# Patient Record
Sex: FEMALE | Race: BLACK OR AFRICAN AMERICAN | Employment: OTHER | URBAN - METROPOLITAN AREA
[De-identification: names, ages, dates, MRNs, and addresses within clinical notes are randomized per-mention and may not be internally consistent; named-entity substitution may affect disease eponyms.]

---

## 2017-02-27 ENCOUNTER — HOSPITAL ENCOUNTER (EMERGENCY)
Age: 52
Discharge: HOME | End: 2017-02-27
Attending: EMERGENCY MEDICINE | Admitting: EMERGENCY MEDICINE
Payer: SELF-PAY

## 2017-02-27 VITALS
DIASTOLIC BLOOD PRESSURE: 100 MMHG | TEMPERATURE: 98.2 F | OXYGEN SATURATION: 100 % | WEIGHT: 198 LBS | HEART RATE: 72 BPM | BODY MASS INDEX: 31.08 KG/M2 | HEIGHT: 67 IN | SYSTOLIC BLOOD PRESSURE: 150 MMHG | RESPIRATION RATE: 18 BRPM

## 2017-02-27 DIAGNOSIS — M79.605 PAIN OF LEFT LOWER EXTREMITY: Primary | ICD-10-CM

## 2017-02-27 DIAGNOSIS — G89.29 CHRONIC LOW BACK PAIN, UNSPECIFIED BACK PAIN LATERALITY, WITH SCIATICA PRESENCE UNSPECIFIED: ICD-10-CM

## 2017-02-27 DIAGNOSIS — M54.5 CHRONIC LOW BACK PAIN, UNSPECIFIED BACK PAIN LATERALITY, WITH SCIATICA PRESENCE UNSPECIFIED: ICD-10-CM

## 2017-02-27 DIAGNOSIS — R51.9 NONINTRACTABLE HEADACHE, UNSPECIFIED CHRONICITY PATTERN, UNSPECIFIED HEADACHE TYPE: ICD-10-CM

## 2017-02-27 LAB
ANION GAP BLD CALC-SCNC: 15 MMOL/L (ref 5–15)
BUN BLD-MCNC: 14 MG/DL (ref 9–20)
CA-I BLD-MCNC: 1.17 MMOL/L (ref 1.12–1.32)
CHLORIDE BLD-SCNC: 104 MMOL/L (ref 98–107)
CK SERPL-CCNC: 96 U/L (ref 26–192)
CO2 BLD-SCNC: 26 MMOL/L (ref 21–32)
CREAT BLD-MCNC: 0.9 MG/DL (ref 0.6–1.3)
GLUCOSE BLD-MCNC: 86 MG/DL (ref 65–105)
HCT VFR BLD CALC: 44 % (ref 35–47)
HGB BLD-MCNC: 15 GM/DL (ref 11.5–16)
POTASSIUM BLD-SCNC: 4.2 MMOL/L (ref 3.5–5.1)
SERVICE CMNT-IMP: NORMAL
SODIUM BLD-SCNC: 140 MMOL/L (ref 136–145)

## 2017-02-27 PROCEDURE — 74011250636 HC RX REV CODE- 250/636: Performed by: PHYSICIAN ASSISTANT

## 2017-02-27 PROCEDURE — 99283 EMERGENCY DEPT VISIT LOW MDM: CPT

## 2017-02-27 PROCEDURE — 80047 BASIC METABLC PNL IONIZED CA: CPT

## 2017-02-27 PROCEDURE — 82550 ASSAY OF CK (CPK): CPT | Performed by: PHYSICIAN ASSISTANT

## 2017-02-27 PROCEDURE — 36415 COLL VENOUS BLD VENIPUNCTURE: CPT | Performed by: PHYSICIAN ASSISTANT

## 2017-02-27 PROCEDURE — 96372 THER/PROPH/DIAG INJ SC/IM: CPT

## 2017-02-27 RX ORDER — KETOROLAC TROMETHAMINE 30 MG/ML
60 INJECTION, SOLUTION INTRAMUSCULAR; INTRAVENOUS
Status: COMPLETED | OUTPATIENT
Start: 2017-02-27 | End: 2017-02-27

## 2017-02-27 RX ORDER — TRAMADOL HYDROCHLORIDE 50 MG/1
50 TABLET ORAL
Qty: 15 TAB | Refills: 0 | Status: SHIPPED | OUTPATIENT
Start: 2017-02-27 | End: 2017-03-01 | Stop reason: DRUGHIGH

## 2017-02-27 RX ORDER — KETOROLAC TROMETHAMINE 30 MG/ML
30 INJECTION, SOLUTION INTRAMUSCULAR; INTRAVENOUS
Status: DISCONTINUED | OUTPATIENT
Start: 2017-02-27 | End: 2017-02-27

## 2017-02-27 RX ORDER — SODIUM CHLORIDE 9 MG/ML
1000 INJECTION, SOLUTION INTRAVENOUS ONCE
Status: DISCONTINUED | OUTPATIENT
Start: 2017-02-27 | End: 2017-02-27

## 2017-02-27 RX ORDER — IBUPROFEN 800 MG/1
800 TABLET ORAL
Qty: 20 TAB | Refills: 0 | Status: SHIPPED | OUTPATIENT
Start: 2017-02-27 | End: 2017-03-06

## 2017-02-27 RX ORDER — METHOCARBAMOL 500 MG/1
500 TABLET, FILM COATED ORAL
Qty: 15 TAB | Refills: 0 | Status: SHIPPED | OUTPATIENT
Start: 2017-02-27 | End: 2017-05-23 | Stop reason: SDUPTHER

## 2017-02-27 RX ADMIN — KETOROLAC TROMETHAMINE 60 MG: 30 INJECTION, SOLUTION INTRAMUSCULAR at 18:53

## 2017-02-27 NOTE — ED NOTES
Patient reports chronic L lower back & L leg pain, she describes as pain to the back & spasms to the leg. Denies injury or falls. Reports 2 days ago she was in so much pain that she thinks she overdosed on medication. The patient reports that #3 Tramadol 50mg pills that where , Motrin & Aleve (2 days ago). The patient reports having a headache afterwards & n/v. She is still having a headache & spasms, no n/v.     .[facility]      Emergency Department Nursing Plan of Care       The Nursing Plan of Care is developed from the Nursing assessment and Emergency Department Attending provider initial evaluation. The plan of care may be reviewed in the ED Provider note.     The Plan of Care was developed with the following considerations:   Patient / Family readiness to learn indicated by:verbalized understanding  Persons(s) to be included in education: patient  Barriers to Learning/Limitations:No    Signed     Barbara Weinberg RN    2017   6:51 PM

## 2017-02-27 NOTE — ED PROVIDER NOTES
Patient is a 49 y.o. female presenting with back pain and leg pain. The history is provided by the patient. Back Pain    This is a chronic (pt reports hx of chronic back pain. pt states she normally takes ultram which helps with back pain but hasn't had any meds to help with mm spasm.) problem. The problem has not changed since onset. The problem occurs daily. Patient reports not work related injury. The pain is associated with no known injury. The pain is present in the left side and lower back. The pain does not radiate. The pain is at a severity of 9/10. The pain is moderate. Associated symptoms include headaches, leg pain and paresthesias. Pertinent negatives include no fever, no abdominal pain, no bowel incontinence, no bladder incontinence, no dysuria, no paresis and no weakness. Treatments tried: pt states on Sat she took 3 ultram, 2 aleeve and ibuprofen. pt states the room started to get dark and she scared herself because she started having HA, N/V so she hasn't taken anything since. Leg Pain    This is a chronic problem. Episode onset: pt reports intermittent mm spasm. The problem occurs daily. The problem has not changed since onset. The pain is present in the left upper leg and left lower leg. The pain is at a severity of 9/10. The pain is moderate. Associated symptoms include back pain. She has tried nothing for the symptoms. There has been no history of extremity trauma. Past Medical History:   Diagnosis Date    Abdominal abscess ([address]     Contact dermatitis and other eczema, due to unspecified cause     Depression     Dyslipidemia     Headache(784.0)     History of mammography, screening 12/2013    Normal     Hypertension     Lumbar stenosis july 2012    Pleurisy     Psychiatric disorder     DEPRESSION.     Scoliosis        Past Surgical History:   Procedure Laterality Date    ABDOMEN SURGERY PROC UNLISTED      abdominal abscess drainage    ENDOSCOPY, COLON, DIAGNOSTIC      HX COLONOSCOPY      HX ENDOSCOPY      HX GI      COLONOSCOPY X 1    HX GYN       , tubal ligation     HX GYN      hysterectomy    HX ORTHOPAEDIC      ganglian cyst removed from right foot  &          Family History:   Problem Relation Age of Onset    Diabetes Mother     Thyroid Disease Mother     Thyroid Disease Father     Hypertension Father     Asthma Sister     MS Sister     Thyroid Disease Sister        Social History     Social History    Marital status:      Spouse name: N/A    Number of children: N/A    Years of education: N/A     Occupational History    Not on file. Social History Main Topics    Smoking status: Former Smoker     Quit date: 2004    Smokeless tobacco: Never Used    Alcohol use 0.0 oz/week      Comment: 1 or 2 glass of wine a month     Drug use: No    Sexual activity: Yes     Partners: Male      Comment: single     Other Topics Concern    Not on file     Social History Narrative         ALLERGIES: Egg; Sulfa (sulfonamide antibiotics); Broccoli; Chicken derived; Dairy aid [lactase]; Doxycycline; Gluten; Pravastatin; Tetracycline; and Wheat    Review of Systems   Constitutional: Negative for fever. Gastrointestinal: Positive for nausea and vomiting (resolved). Negative for abdominal pain and bowel incontinence. Genitourinary: Negative for bladder incontinence and dysuria. Musculoskeletal: Positive for back pain and myalgias. Skin: Negative. Neurological: Positive for headaches and paresthesias. Negative for weakness. All other systems reviewed and are negative. Vitals:    17 1808   BP: (!) 148/107   Pulse: 64   Resp: 18   Temp: 98.2 °F (36.8 °C)   SpO2: 100%   Weight: 89.8 kg (198 lb)   Height: 5' 6.5\" (1.689 m)            Physical Exam   Constitutional: She is oriented to person, place, and time. She appears well-developed and well-nourished. No distress. HENT:   Head: Normocephalic and atraumatic. Mouth/Throat: Oropharynx is clear and moist. No oropharyngeal exudate. Eyes: Conjunctivae are normal.   Cardiovascular: Normal rate, regular rhythm and normal heart sounds. Pulmonary/Chest: Effort normal and breath sounds normal. No respiratory distress. She has no wheezes. She has no rales. Musculoskeletal: Normal range of motion. Lumbar back: She exhibits tenderness and pain (to the L. lumbar mm, -SLR bilaterally). She exhibits normal range of motion, no bony tenderness and normal pulse. Neurological: She is alert and oriented to person, place, and time. Skin: Skin is warm and dry. Psychiatric: She has a normal mood and affect. Her behavior is normal. Judgment and thought content normal.   Vitals reviewed.        MDM  Number of Diagnoses or Management Options  Diagnosis management comments: DDX: sciatica, back strain, sprain, electrolyte imbalance, rhabdomylosis       Amount and/or Complexity of Data Reviewed  Clinical lab tests: ordered and reviewed      ED Course       Procedures

## 2017-02-27 NOTE — ED TRIAGE NOTES
Pt reports low back pain and left leg pain, constant sharp, stabbing pain x a few weeks, worse x 2 days. Pt states, \"now I have a headache and I'm vomiting because I took too much medicine on Saturday because I was hurting so bad. \"  Pt reports taking Tramadol, Aleve, Ibuprofen with vomiting. Pt reports vomiting x 2 days.

## 2017-02-28 NOTE — DISCHARGE INSTRUCTIONS
Headache: Care Instructions  Your Care Instructions    Headaches have many possible causes. Most headaches aren't a sign of a more serious problem, and they will get better on their own. Home treatment may help you feel better faster. The doctor has checked you carefully, but problems can develop later. If you notice any problems or new symptoms, get medical treatment right away. Follow-up care is a key part of your treatment and safety. Be sure to make and go to all appointments, and call your doctor if you are having problems. It's also a good idea to know your test results and keep a list of the medicines you take. How can you care for yourself at home? · Do not drive if you have taken a prescription pain medicine. · Rest in a quiet, dark room until your headache is gone. Close your eyes and try to relax or go to sleep. Don't watch TV or read. · Put a cold, moist cloth or cold pack on the painful area for 10 to 20 minutes at a time. Put a thin cloth between the cold pack and your skin. · Use a warm, moist towel or a heating pad set on low to relax tight shoulder and neck muscles. · Have someone gently massage your neck and shoulders. · Take pain medicines exactly as directed. ¨ If the doctor gave you a prescription medicine for pain, take it as prescribed. ¨ If you are not taking a prescription pain medicine, ask your doctor if you can take an over-the-counter medicine. · Be careful not to take pain medicine more often than the instructions allow, because you may get worse or more frequent headaches when the medicine wears off. · Do not ignore new symptoms that occur with a headache, such as a fever, weakness or numbness, vision changes, or confusion. These may be signs of a more serious problem. To prevent headaches  · Keep a headache diary so you can figure out what triggers your headaches. Avoiding triggers may help you prevent headaches.  Record when each headache began, how long it lasted, and what the pain was like (throbbing, aching, stabbing, or dull). Write down any other symptoms you had with the headache, such as nausea, flashing lights or dark spots, or sensitivity to bright light or loud noise. Note if the headache occurred near your period. List anything that might have triggered the headache, such as certain foods (chocolate, cheese, wine) or odors, smoke, bright light, stress, or lack of sleep. · Find healthy ways to deal with stress. Headaches are most common during or right after stressful times. Take time to relax before and after you do something that has caused a headache in the past.  · Try to keep your muscles relaxed by keeping good posture. Check your jaw, face, neck, and shoulder muscles for tension, and try relaxing them. When sitting at a desk, change positions often, and stretch for 30 seconds each hour. · Get plenty of sleep and exercise. · Eat regularly and well. Long periods without food can trigger a headache. · Treat yourself to a massage. Some people find that regular massages are very helpful in relieving tension. · Limit caffeine by not drinking too much coffee, tea, or soda. But don't quit caffeine suddenly, because that can also give you headaches. · Reduce eyestrain from computers by blinking frequently and looking away from the computer screen every so often. Make sure you have proper eyewear and that your monitor is set up properly, about an arm's length away. · Seek help if you have depression or anxiety. Your headaches may be linked to these conditions. Treatment can both prevent headaches and help with symptoms of anxiety or depression. When should you call for help? Call 911 anytime you think you may need emergency care. For example, call if:  · You have signs of a stroke. These may include:  ¨ Sudden numbness, paralysis, or weakness in your face, arm, or leg, especially on only one side of your body. ¨ Sudden vision changes.   ¨ Sudden trouble speaking. ¨ Sudden confusion or trouble understanding simple statements. ¨ Sudden problems with walking or balance. ¨ A sudden, severe headache that is different from past headaches. Call your doctor now or seek immediate medical care if:  · You have a new or worse headache. · Your headache gets much worse. Where can you learn more? Go to http://sunshine-kandi.info/. Enter M271 in the search box to learn more about \"Headache: Care Instructions. \"  Current as of: February 19, 2016  Content Version: 11.1  © 6906-2948 [employer]. Care instructions adapted under license by [employer] (which disclaims liability or warranty for this information). If you have questions about a medical condition or this instruction, always ask your healthcare professional. [address] any warranty or liability for your use of this information. Back Pain: Care Instructions  Your Care Instructions    Back pain has many possible causes. It is often related to problems with muscles and ligaments of the back. It may also be related to problems with the nerves, discs, or bones of the back. Moving, lifting, standing, sitting, or sleeping in an awkward way can strain the back. Sometimes you don't notice the injury until later. Arthritis is another common cause of back pain. Although it may hurt a lot, back pain usually improves on its own within several weeks. Most people recover in 12 weeks or less. Using good home treatment and being careful not to stress your back can help you feel better sooner. Follow-up care is a key part of your treatment and safety. Be sure to make and go to all appointments, and call your doctor if you are having problems. Its also a good idea to know your test results and keep a list of the medicines you take. How can you care for yourself at home? · Sit or lie in positions that are most comfortable and reduce your pain.  Try one of these positions when you lie down:  ¨ Lie on your back with your knees bent and supported by large pillows. ¨ Lie on the floor with your legs on the seat of a sofa or chair. Barbara Jason on your side with your knees and hips bent and a pillow between your legs. ¨ Lie on your stomach if it does not make pain worse. · Do not sit up in bed, and avoid soft couches and twisted positions. Bed rest can help relieve pain at first, but it delays healing. Avoid bed rest after the first day of back pain. · Change positions every 30 minutes. If you must sit for long periods of time, take breaks from sitting. Get up and walk around, or lie in a comfortable position. · Try using a heating pad on a low or medium setting for 15 to 20 minutes every 2 or 3 hours. Try a warm shower in place of one session with the heating pad. · You can also try an ice pack for 10 to 15 minutes every 2 to 3 hours. Put a thin cloth between the ice pack and your skin. · Take pain medicines exactly as directed. ¨ If the doctor gave you a prescription medicine for pain, take it as prescribed. ¨ If you are not taking a prescription pain medicine, ask your doctor if you can take an over-the-counter medicine. · Take short walks several times a day. You can start with 5 to 10 minutes, 3 or 4 times a day, and work up to longer walks. Walk on level surfaces and avoid hills and stairs until your back is better. · Return to work and other activities as soon as you can. Continued rest without activity is usually not good for your back. · To prevent future back pain, do exercises to stretch and strengthen your back and stomach. Learn how to use good posture, safe lifting techniques, and proper body mechanics. When should you call for help? Call your doctor now or seek immediate medical care if:  · You have new or worsening numbness in your legs. · You have new or worsening weakness in your legs.  (This could make it hard to stand up.)  · You lose control of your bladder or bowels. Watch closely for changes in your health, and be sure to contact your doctor if:  · Your pain gets worse. · You are not getting better after 2 weeks. Where can you learn more? Go to http://sunshine-kandi.info/. Enter M676 in the search box to learn more about \"Back Pain: Care Instructions. \"  Current as of: May 23, 2016  Content Version: 11.1  © 2006-2016 [employer]. Care instructions adapted under license by [employer] (which disclaims liability or warranty for this information). If you have questions about a medical condition or this instruction, always ask your healthcare professional. [address] any warranty or liability for your use of this information. Leg Pain: Care Instructions  Your Care Instructions  Many things can cause leg pain. Too much exercise or overuse can cause a muscle cramp (or charley horse). You can get leg cramps from not eating a balanced diet that has enough potassium, calcium, and other minerals. If you do not drink enough fluids or are taking certain medicines, you may develop leg cramps. Other causes of leg pain include injuries, blood flow problems, nerve damage, and twisted and enlarged veins (varicose veins). You can usually ease pain with self-care. Your doctor may recommend that you rest your leg and keep it elevated. Follow-up care is a key part of your treatment and safety. Be sure to make and go to all appointments, and call your doctor if you are having problems. Its also a good idea to know your test results and keep a list of the medicines you take. How can you care for yourself at home? · Take pain medicines exactly as directed. ¨ If the doctor gave you a prescription medicine for pain, take it as prescribed. ¨ If you are not taking a prescription pain medicine, ask your doctor if you can take an over-the-counter medicine. · Take any other medicines exactly as prescribed. Call your doctor if you think you are having a problem with your medicine. · Rest your leg while you have pain, and avoid standing for long periods of time. · Prop up your leg at or above the level of your heart when possible. · Make sure you are eating a balanced diet that is rich in calcium, potassium, and magnesium, especially if you are pregnant. · If directed by your doctor, put ice or a cold pack on the area for 10 to 20 minutes at a time. Put a thin cloth between the ice and your skin. · Your leg may be in a splint, a brace, or an elastic bandage, and you may have crutches to help you walk. Follow your doctor's directions about how long to wear supports and how to use the crutches. When should you call for help? Call 911 anytime you think you may need emergency care. For example, call if:  · You have sudden chest pain and shortness of breath, or you cough up blood. · Your leg is cool or pale or changes color. Call your doctor now or seek immediate medical care if:  · You have increasing or severe pain. · Your leg suddenly feels weak and you cannot move it. · You have signs of a blood clot, such as:  ¨ Pain in your calf, back of the knee, thigh, or groin. ¨ Redness and swelling in your leg or groin. · You have signs of infection, such as:  ¨ Increased pain, swelling, warmth, or redness. ¨ Red streaks leading from the sore area. ¨ Pus draining from a place on your leg. ¨ A fever. · You cannot bear weight on your leg. Watch closely for changes in your health, and be sure to contact your doctor if:  · You do not get better as expected. Where can you learn more? Go to http://sunshnie-kandi.info/. Enter J445 in the search box to learn more about \"Leg Pain: Care Instructions. \"  Current as of: May 27, 2016  Content Version: 11.1  © 7022-5075 [employer].  Care instructions adapted under license by [employer] (which disclaims liability or warranty for this information). If you have questions about a medical condition or this instruction, always ask your healthcare professional. [place] [address] any warranty or liability for your use of this information.

## 2017-02-28 NOTE — ED NOTES
Verbal shift [facility] ([facility]) by María Maya (offgoing nurse). Report included the following information SBAR and Procedure Summary.

## 2017-02-28 NOTE — ED NOTES
Discharge Instructions Reviewed with patient per [facility]. Discharge instructions given to paitent per [facility]. Patient able to return verbalize discharge instructions. Paper copy of discharge instructions given. 3 RX given to patient per [facility]. Patient condition stable, Respiratory status WNL, Neurostatus intact. Ambulatory out of er, to home with self.  Patient ambulatory out of ED prior to taking discharge vital signs

## 2017-03-01 ENCOUNTER — OFFICE VISIT (OUTPATIENT)
Dept: INTERNAL MEDICINE CLINIC | Facility: CLINIC | Age: 52
End: 2017-03-01

## 2017-03-01 VITALS
HEIGHT: 67 IN | BODY MASS INDEX: 33.74 KG/M2 | SYSTOLIC BLOOD PRESSURE: 134 MMHG | DIASTOLIC BLOOD PRESSURE: 78 MMHG | HEART RATE: 60 BPM | WEIGHT: 215 LBS | TEMPERATURE: 97.3 F | RESPIRATION RATE: 18 BRPM

## 2017-03-01 DIAGNOSIS — G47.33 OSA ON CPAP: ICD-10-CM

## 2017-03-01 DIAGNOSIS — Z99.89 OSA ON CPAP: ICD-10-CM

## 2017-03-01 DIAGNOSIS — G89.29 CHRONIC BILATERAL LOW BACK PAIN WITH BILATERAL SCIATICA: Primary | ICD-10-CM

## 2017-03-01 DIAGNOSIS — J45.990 EXERCISE-INDUCED ASTHMA: ICD-10-CM

## 2017-03-01 DIAGNOSIS — T78.08XA: ICD-10-CM

## 2017-03-01 DIAGNOSIS — Z12.39 SCREENING FOR BREAST CANCER: ICD-10-CM

## 2017-03-01 DIAGNOSIS — M79.7 FIBROMYALGIA: ICD-10-CM

## 2017-03-01 DIAGNOSIS — M54.42 CHRONIC BILATERAL LOW BACK PAIN WITH BILATERAL SCIATICA: Primary | ICD-10-CM

## 2017-03-01 DIAGNOSIS — J01.90 ACUTE NON-RECURRENT SINUSITIS, UNSPECIFIED LOCATION: ICD-10-CM

## 2017-03-01 DIAGNOSIS — M54.41 CHRONIC BILATERAL LOW BACK PAIN WITH BILATERAL SCIATICA: Primary | ICD-10-CM

## 2017-03-01 RX ORDER — GABAPENTIN 300 MG/1
300 CAPSULE ORAL 3 TIMES DAILY
Qty: 90 CAP | Refills: 2 | Status: SHIPPED | OUTPATIENT
Start: 2017-03-01 | End: 2017-05-23 | Stop reason: SDUPTHER

## 2017-03-01 RX ORDER — EPINEPHRINE 0.3 MG/.3ML
0.3 INJECTION SUBCUTANEOUS
Qty: 1 SYRINGE | Refills: 1 | Status: SHIPPED | OUTPATIENT
Start: 2017-03-01 | End: 2017-08-08 | Stop reason: SDUPTHER

## 2017-03-01 RX ORDER — TRAMADOL HYDROCHLORIDE 50 MG/1
50 TABLET ORAL
Qty: 60 TAB | Refills: 1 | Status: SHIPPED | OUTPATIENT
Start: 2017-03-01 | End: 2017-10-24

## 2017-03-01 RX ORDER — AMOXICILLIN AND CLAVULANATE POTASSIUM 875; 125 MG/1; MG/1
1 TABLET, FILM COATED ORAL EVERY 12 HOURS
Qty: 14 TAB | Refills: 0 | Status: SHIPPED | OUTPATIENT
Start: 2017-03-01 | End: 2017-04-18

## 2017-03-01 NOTE — MR AVS SNAPSHOT
Visit Information Date & Time Provider Department Dept. Phone Encounter #  
 3/1/2017  9:00 AM Amanda Sinha, [address] Internal Medicine [phone] [id number] Follow-up Instructions Return for Physical when convenient. Upcoming Health Maintenance Date Due DTaP/Tdap/Td series (1 - Tdap) 9/16/1987 PAP AKA CERVICAL CYTOLOGY 9/16/1987 INFLUENZA AGE 9 TO ADULT 8/1/2016 BREAST CANCER SCRN MAMMOGRAM 9/16/2016 FOBT Q 1 YEAR AGE 50-75 9/16/2016 Allergies as of 3/1/2017  Review Complete On: 3/1/2017 By: Amanda Sinha PA-C Severity Noted Reaction Type Reactions Egg High 01/28/2011    Shortness of Breath, Nausea and Vomiting  
 Sulfa (Sulfonamide Antibiotics) High 11/12/2010    Anaphylaxis, Shortness of Breath, Swelling Broccoli  01/28/2011    Rash, Itching Chicken Derived  02/11/2013    Nausea and Vomiting Dairy Aid [Lactase]  01/28/2011    Swelling Doxycycline  11/12/2010    Unknown (comments) Gluten  01/28/2011    Swelling Pravastatin  09/04/2012    Myalgia, Other (comments) Blurry vision Tetracycline  11/12/2010    Unknown (comments) Wheat  01/28/2011    Nausea and Vomiting Current Immunizations  Reviewed on 11/22/2013 Name Date Influenza Vaccine PF  Deferred (Patient Refused) Pneumococcal Conjugate (PCV-13)  Deferred (Patient Refused) Not reviewed this visit You Were Diagnosed With   
  
 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  
  
  
  
  
  
 134/78 Vitals History BMI and BSA Data Body Mass Index Body Surface Area  
 34.18 kg/m 2 2.14 m 2 Preferred Pharmacy Pharmacy Name Phone [facility] [address] [place] [facility] Boby Luong [phone] Your Updated Medication List  
  
   
This list is accurate as of: 3/1/17  9:41 AM.  Always use your most recent med list.  
  
  
  
  
 amoxicillin-clavulanate 875-125 mg per tablet Commonly known as:  AUGMENTIN Take 1 Tab by mouth every twelve (12) hours. EPINEPHrine 0.3 mg/0.3 mL injection Commonly known as:  EPIPEN  
0.3 mL by IntraMUSCular route once as needed for up to 1 dose. gabapentin 300 mg capsule Commonly known as:  NEURONTIN Take 1 Cap by mouth three (3) times daily. ibuprofen 800 mg tablet Commonly known as:  MOTRIN Take 1 Tab by mouth every six (6) hours as needed for Pain for up to 7 days. methocarbamol 500 mg tablet Commonly known as:  ROBAXIN Take 1 Tab by mouth every six (6) hours as needed (mm spasm). traMADol 50 mg tablet Commonly known as:  ULTRAM  
Take 1 Tab by mouth every six (6) hours as needed for Pain. Max Daily Amount: 200 mg. Prescriptions Printed Refills  
 traMADol (ULTRAM) 50 mg tablet 1 Sig: Take 1 Tab by mouth every six (6) hours as needed for Pain. Max Daily Amount: 200 mg. Class: Print Route: Oral  
  
Prescriptions Sent to Pharmacy Refills EPINEPHrine (EPIPEN) 0.3 mg/0.3 mL injection 1 Si.3 mL by IntraMUSCular route once as needed for up to 1 dose. Class: Normal  
 [facility] [id number] [place] [facility] [phone] Route: IntraMUSCular  
 gabapentin (NEURONTIN) 300 mg capsule 2 Sig: Take 1 Cap by mouth three (3) times daily. Class: Normal  
 [facility] [address] [place] [facility] #: [phone]  Route: Oral  
 amoxicillin-clavulanate (AUGMENTIN) 875-125 mg per tablet 0  
 Sig: Take 1 Tab by mouth every twelve (12) hours. Class: Normal  
 Pharmacy: [facility] [id number] [place] [facility] Boby PATTERSON  #: [phone] Route: Oral  
  
Follow-up Instructions Return for Physical when convenient. Introducing [facility]! Dear Carmela Cowden: Thank you for requesting a [employer] account. Our records indicate that you already have an active [employer] account. You can access your account anytime at [email or web address] Did you know that you can access your hospital and ER discharge instructions at any time in [employer] You can also review all of your test results from your hospital stay or ER visit. Additional Information If you have questions, please visit the Frequently Asked Questions section of the [employer] website at [email or web address] Remember, [employer] is NOT to be used for urgent needs. For medical emergencies, dial 911. Now available from your iPhone and Android! Please provide this summary of care documentation to your next provider. Your primary care clinician is listed as Ramona Sevilla. If you have any questions after today's visit, please call [phone].

## 2017-03-01 NOTE — PROGRESS NOTES
Chief Complaint   Patient presents with   [facility] Follow Up     left leg and back pain. 1. Have you been to the ER, urgent care clinic since your last visit? Hospitalized since your last visit? No    2. Have you seen or consulted any other health care providers outside of the [address] [place] [address] since your last visit? Include any pap smears or colon screening.  No

## 2017-03-01 NOTE — PROGRESS NOTES
HISTORY OF PRESENT ILLNESS  Otis Juárez is a 49 y.o. female. New patient to our practice - wants to change practices. HPI  1) Hx low back pain/leg pain x 15 years - had lumbar laminectomy in 2015. Has had multiple follow ups with ortho. Was on narcotics regularly until 2013 - weaned off of them and doesn't want to go back to narcotics. Pain is improved with gabapentin 300 mg TID. Ran out when she didn't have insurance last year. Has been off of gabapentin since Thanksgiving. Pain is improved with exercise 3x/week. 2) JENNIE - hasn't used CPAP in 5 months - not snoring and not fatigued. Lost weight over the past 2 years. Was previously 273 lbs. Hasn't had re-evaluation since losing weight. 3) Hx HTN - Well controlled upon recheck in our office today. Not requiring medication. 4) Sinus headache x 2 weeks. Nasal congestion. Cough off and on - nonproductive. Review of Systems   Constitutional: Positive for malaise/fatigue. Negative for fever. HENT: Positive for congestion. Negative for ear pain. Respiratory: Positive for cough and sputum production. Negative for shortness of breath. Musculoskeletal: Positive for back pain and myalgias. Negative for falls. Neurological: Positive for headaches. Endo/Heme/Allergies: Negative for environmental allergies. Psychiatric/Behavioral: Negative for depression and substance abuse. The patient is not nervous/anxious. Physical Exam   Constitutional: She is oriented to person, place, and time. She appears well-developed and well-nourished. No distress. HENT:   Head: Normocephalic and atraumatic. Mouth/Throat: Oropharynx is clear and moist.   Bilateral TMs with fluid. No erythema. Nasal mucosa red, inflamed. Eyes: Conjunctivae are normal.   Neck: Neck supple. Cardiovascular: Normal rate, regular rhythm and normal heart sounds.     Pulmonary/Chest: Effort normal and breath sounds normal.   Lymphadenopathy:     She has no cervical adenopathy. Neurological: She is alert and oriented to person, place, and time. Skin: Skin is warm and dry. Nursing note and vitals reviewed. ASSESSMENT and PLAN    ICD-10-CM ICD-9-CM    1. Chronic bilateral low back pain with bilateral sciatica M54.42 724.2 Restart and taper up to previous dose of 300 mg TID: gabapentin (NEURONTIN) 300 mg capsule    M54.41 724.3 traMADol (ULTRAM) 50 mg tablet PRN. Advised pt to use this sparingly. Advised pt that she can fill the Robaxin Rx'd by ER, and use this sparingly until she is under better control with gabapentin. G89.29 338.29    2. Fibromyalgia M79.7 729.1 Refill gabapentin (NEURONTIN) 300 mg capsule   3. Exercise-induced asthma J45.990 493.81 Pt has albuterol inhaler at home and it is UTD. 4. Allergy with anaphylaxis due to eggs T78. 08XA 995.68 EPINEPHrine (EPIPEN) 0.3 mg/0.3 mL injection   5. Acute non-recurrent sinusitis, unspecified location J01.90 461.9 amoxicillin-clavulanate (AUGMENTIN) 875-125 mg per tablet   6. Screening for breast cancer Z12.39 V76.10 ALIA MAMMO BI SCREENING INCL CAD   7. JENNIE on CPAP G47.33 327.23 Encouraged pt to f/u with Sleep Med, as untreated JENNIE may be making her pain worse, and given her excellent weight loss, she may need an adjustment in CPAP settings.

## 2017-03-03 ENCOUNTER — HOSPITAL ENCOUNTER (OUTPATIENT)
Dept: MAMMOGRAPHY | Age: 52
Discharge: HOME | End: 2017-03-03
Attending: PHYSICIAN ASSISTANT
Payer: COMMERCIAL

## 2017-03-03 DIAGNOSIS — Z12.31 VISIT FOR SCREENING MAMMOGRAM: ICD-10-CM

## 2017-03-03 PROCEDURE — 77067 SCR MAMMO BI INCL CAD: CPT

## 2017-03-21 ENCOUNTER — OFFICE VISIT (OUTPATIENT)
Dept: INTERNAL MEDICINE CLINIC | Facility: CLINIC | Age: 52
End: 2017-03-21

## 2017-03-21 VITALS
WEIGHT: 210 LBS | OXYGEN SATURATION: 97 % | HEIGHT: 67 IN | HEART RATE: 70 BPM | SYSTOLIC BLOOD PRESSURE: 135 MMHG | BODY MASS INDEX: 32.96 KG/M2 | TEMPERATURE: 97.9 F | RESPIRATION RATE: 18 BRPM | DIASTOLIC BLOOD PRESSURE: 90 MMHG

## 2017-03-21 DIAGNOSIS — J01.91 ACUTE RECURRENT SINUSITIS, UNSPECIFIED LOCATION: Primary | ICD-10-CM

## 2017-03-21 RX ORDER — LEVOFLOXACIN 500 MG/1
500 TABLET, FILM COATED ORAL DAILY
Qty: 10 TAB | Refills: 0 | Status: SHIPPED | OUTPATIENT
Start: 2017-03-21 | End: 2017-04-19

## 2017-03-21 RX ORDER — METHYLPREDNISOLONE 4 MG/1
TABLET ORAL
Qty: 1 DOSE PACK | Refills: 0 | Status: SHIPPED | OUTPATIENT
Start: 2017-03-21 | End: 2017-04-19

## 2017-03-21 NOTE — PROGRESS NOTES
Room 8    Chief Complaint   Patient presents with    Ear Pain     Patient states she has post-nasal drip in the left ear area. Head aches     1. Have you been to the ER, urgent care clinic since your last visit? Hospitalized since your last visit? No    2. Have you seen or consulted any other health care providers outside of the [address] [place] [address] since your last visit? Include any pap smears or colon screening.  No     Health Maintenance Due   Topic Date Due    Pneumococcal 19-64 Medium Risk (1 of 1 - PPSV23) 09/16/1985    DTaP/Tdap/Td series (1 - Tdap) 09/16/1987    FOBT Q 1 YEAR AGE 50-75  09/16/2016

## 2017-03-21 NOTE — PROGRESS NOTES
HISTORY OF PRESENT ILLNESS  Samara Higgins is a 49 y.o. female. HPI  1) Sick since last visit. Finished Augmentin - started getting better, but then sx returned. L ear pain/itching, headache. No cough. Tried [employer] sinus congestion medication - not helping. Drinking Elderberry syrup - helps with sore throat. Has a new grandchild due this weekend! Review of Systems   Constitutional: Positive for malaise/fatigue. Negative for fever. HENT: Positive for congestion and ear pain. Respiratory: Negative for cough, sputum production and shortness of breath. Neurological: Positive for headaches. Endo/Heme/Allergies: Negative for environmental allergies. Physical Exam   Constitutional: She is oriented to person, place, and time. She appears well-developed and well-nourished. No distress. HENT:   Head: Normocephalic and atraumatic. Mouth/Throat: Oropharynx is clear and moist.   Bilateral TMs with fluid. No erythema. Nasal mucosa red, inflamed. Eyes: Conjunctivae are normal.   Neck: Neck supple. Cardiovascular: Normal rate, regular rhythm and normal heart sounds. Pulmonary/Chest: Effort normal and breath sounds normal.   Lymphadenopathy:     She has no cervical adenopathy. Neurological: She is alert and oriented to person, place, and time. Skin: Skin is warm and dry. Nursing note and vitals reviewed. ASSESSMENT and PLAN    ICD-10-CM ICD-9-CM    1.  Acute recurrent sinusitis, unspecified location J01.91 461.9 methylPREDNISolone (MEDROL DOSEPACK) 4 mg tablet      levoFLOXacin (LEVAQUIN) 500 mg tablet

## 2017-04-19 ENCOUNTER — OFFICE VISIT (OUTPATIENT)
Dept: INTERNAL MEDICINE CLINIC | Facility: CLINIC | Age: 52
End: 2017-04-19

## 2017-04-19 VITALS
SYSTOLIC BLOOD PRESSURE: 144 MMHG | HEART RATE: 59 BPM | DIASTOLIC BLOOD PRESSURE: 92 MMHG | HEIGHT: 67 IN | TEMPERATURE: 97.7 F | BODY MASS INDEX: 33.16 KG/M2 | RESPIRATION RATE: 18 BRPM | WEIGHT: 211.3 LBS | OXYGEN SATURATION: 99 %

## 2017-04-19 DIAGNOSIS — J30.2 SEASONAL ALLERGIC RHINITIS, UNSPECIFIED ALLERGIC RHINITIS TRIGGER: Primary | ICD-10-CM

## 2017-04-19 DIAGNOSIS — I10 ESSENTIAL HYPERTENSION: ICD-10-CM

## 2017-04-19 DIAGNOSIS — M54.42 CHRONIC BILATERAL LOW BACK PAIN WITH LEFT-SIDED SCIATICA: ICD-10-CM

## 2017-04-19 DIAGNOSIS — G89.29 CHRONIC BILATERAL LOW BACK PAIN WITH LEFT-SIDED SCIATICA: ICD-10-CM

## 2017-04-19 PROBLEM — M54.41 CHRONIC BILATERAL LOW BACK PAIN WITH BILATERAL SCIATICA: Status: RESOLVED | Noted: 2017-03-01 | Resolved: 2017-04-19

## 2017-04-19 RX ORDER — AMLODIPINE BESYLATE 5 MG/1
5 TABLET ORAL DAILY
Qty: 30 TAB | Refills: 5 | Status: SHIPPED | OUTPATIENT
Start: 2017-04-19 | End: 2017-05-23 | Stop reason: SDUPTHER

## 2017-04-19 RX ORDER — CETIRIZINE HCL 10 MG
10 TABLET ORAL
Qty: 30 TAB | Refills: 11 | Status: SHIPPED | OUTPATIENT
Start: 2017-04-19 | End: 2017-05-23 | Stop reason: SDUPTHER

## 2017-04-19 NOTE — PROGRESS NOTES
HISTORY OF PRESENT ILLNESS  Parul Cao is a 49 y.o. female. HPI  1) Fibromyalgia, Sciatica - denied for disability 3 times now. Last saw ortho over 1 year ago. Pt notes her back pain/sciatica is not controlled on gabapentin and she does not want to do surgery. She understands that we do not write narcotic pain medication for chronic pain. She plans to follow up with ortho (she will call for referral order) and reapply for disability. 2) Allergic Rhinitis - sinus headaches, nasal congestion, cough. No Sore throat/SOB. Sinus infection resolved with Levaquin and Medrol in late March. 3) Elevated BP - was on Amlodipine and HCTZ in the past. Has been off of both for over 1 year. BP Readings from Last 3 Encounters:   04/19/17 (!) 144/92   03/21/17 135/90   03/01/17 134/78     4) Depression/Anxiety - does not want to take medication or treat this as antidepressants as they made her feel \"flat\". Tried many including Cymbalta and Wellbutrin. Lots of stress with aging parents. Review of Systems   Constitutional: Positive for malaise/fatigue. Negative for fever. HENT: Positive for congestion and ear pain. Eyes: Positive for discharge. Respiratory: Positive for cough. Negative for sputum production and shortness of breath. Musculoskeletal: Positive for back pain, joint pain and myalgias. Skin: Negative for rash. Neurological: Positive for headaches. Endo/Heme/Allergies: Positive for environmental allergies. Psychiatric/Behavioral: Positive for depression. The patient is nervous/anxious. Physical Exam   Constitutional: She is oriented to person, place, and time. She appears well-developed and well-nourished. No distress. HENT:   Head: Normocephalic and atraumatic. Mouth/Throat: Oropharynx is clear and moist.   Bilateral TMs with fluid. No erythema. Nasal mucosa pale, inflamed. Eyes: Conjunctivae are normal.   Neck: Neck supple.    Cardiovascular: Normal rate, regular rhythm and normal heart sounds. Pulmonary/Chest: Effort normal and breath sounds normal.   Lymphadenopathy:     She has no cervical adenopathy. Neurological: She is alert and oriented to person, place, and time. Skin: Skin is warm and dry. Nursing note and vitals reviewed. ASSESSMENT and PLAN    ICD-10-CM ICD-9-CM    1. Seasonal allergic rhinitis, unspecified allergic rhinitis trigger J30.2 477.9 cetirizine (ZYRTEC) 10 mg tablet  Also recommended OTC Flonase   2. Chronic bilateral low back pain with left-sided sciatica M54.42 724.2 Follow up with ortho. G89.29 724.3      338.29    3.  Essential hypertension I10 401.9 Start amLODIPine (NORVASC) 5 mg tablet

## 2017-04-19 NOTE — PROGRESS NOTES
Room 8    Chief Complaint   Patient presents with    Back Pain     States she would like o discuss the pain in back ,legs,and hand. States she still has problems with her sinus also     /1. Have you been to the ER, urgent care clinic since your last visit? Hospitalized since your last visit? No    2. Have you seen or consulted any other health care providers outside of the [address] [place] [address] since your last visit? Include any pap smears or colon screening.  No     Health Maintenance Due   Topic Date Due    Pneumococcal 19-64 Medium Risk (1 of 1 - PPSV23) 09/16/1985    DTaP/Tdap/Td series (1 - Tdap) 09/16/1987    FOBT Q 1 YEAR AGE 50-75  09/16/2016

## 2017-04-21 ENCOUNTER — PATIENT MESSAGE (OUTPATIENT)
Dept: INTERNAL MEDICINE CLINIC | Facility: CLINIC | Age: 52
End: 2017-04-21

## 2017-04-21 DIAGNOSIS — M54.42 CHRONIC BILATERAL LOW BACK PAIN WITH LEFT-SIDED SCIATICA: ICD-10-CM

## 2017-04-21 DIAGNOSIS — G89.29 CHRONIC BILATERAL LOW BACK PAIN WITH LEFT-SIDED SCIATICA: ICD-10-CM

## 2017-04-21 DIAGNOSIS — L30.9 ECZEMA, UNSPECIFIED TYPE: Primary | ICD-10-CM

## 2017-04-24 RX ORDER — FLUOCINONIDE 0.5 MG/G
CREAM TOPICAL 2 TIMES DAILY
Qty: 30 G | Refills: 1 | Status: SHIPPED | OUTPATIENT
Start: 2017-04-24 | End: 2017-05-23 | Stop reason: SDUPTHER

## 2017-04-24 RX ORDER — IBUPROFEN 800 MG/1
800 TABLET ORAL
Qty: 60 TAB | Refills: 2 | Status: SHIPPED | OUTPATIENT
Start: 2017-04-24 | End: 2017-05-23 | Stop reason: SDUPTHER

## 2017-05-23 ENCOUNTER — PATIENT MESSAGE (OUTPATIENT)
Dept: INTERNAL MEDICINE CLINIC | Facility: CLINIC | Age: 52
End: 2017-05-23

## 2017-05-24 RX ORDER — METHOCARBAMOL 500 MG/1
500 TABLET, FILM COATED ORAL
Qty: 15 TAB | Refills: 0 | Status: SHIPPED | OUTPATIENT
Start: 2017-05-24 | End: 2017-08-08

## 2017-05-25 ENCOUNTER — APPOINTMENT (OUTPATIENT)
Dept: GENERAL RADIOLOGY | Age: 52
End: 2017-05-25
Attending: EMERGENCY MEDICINE
Payer: COMMERCIAL

## 2017-05-25 ENCOUNTER — HOSPITAL ENCOUNTER (EMERGENCY)
Age: 52
Discharge: HOME | End: 2017-05-25
Attending: EMERGENCY MEDICINE
Payer: COMMERCIAL

## 2017-05-25 VITALS
SYSTOLIC BLOOD PRESSURE: 150 MMHG | WEIGHT: 210 LBS | TEMPERATURE: 98.2 F | RESPIRATION RATE: 16 BRPM | HEART RATE: 59 BPM | DIASTOLIC BLOOD PRESSURE: 99 MMHG | OXYGEN SATURATION: 100 % | BODY MASS INDEX: 33.75 KG/M2 | HEIGHT: 66 IN

## 2017-05-25 DIAGNOSIS — R07.9 CHEST PAIN, UNSPECIFIED TYPE: Primary | ICD-10-CM

## 2017-05-25 LAB
ALBUMIN SERPL BCP-MCNC: 4.1 G/DL (ref 3.5–5)
ALBUMIN/GLOB SERPL: 1.1 {RATIO} (ref 1.1–2.2)
ALP SERPL-CCNC: 58 U/L (ref 45–117)
ALT SERPL-CCNC: 19 U/L (ref 12–78)
ANION GAP BLD CALC-SCNC: 5 MMOL/L (ref 5–15)
AST SERPL W P-5'-P-CCNC: 7 U/L (ref 15–37)
BASOPHILS # BLD AUTO: 0 K/UL (ref 0–0.1)
BASOPHILS # BLD: 0 % (ref 0–1)
BILIRUB SERPL-MCNC: 0.4 MG/DL (ref 0.2–1)
BUN SERPL-MCNC: 14 MG/DL (ref 6–20)
BUN/CREAT SERPL: 18 (ref 12–20)
CALCIUM SERPL-MCNC: 9.1 MG/DL (ref 8.5–10.1)
CHLORIDE SERPL-SCNC: 106 MMOL/L (ref 97–108)
CK SERPL-CCNC: 93 U/L (ref 26–192)
CO2 SERPL-SCNC: 30 MMOL/L (ref 21–32)
CREAT SERPL-MCNC: 0.77 MG/DL (ref 0.55–1.02)
D DIMER PPP FEU-MCNC: 0.22 MG/L FEU (ref 0–0.65)
EOSINOPHIL # BLD: 0.1 K/UL (ref 0–0.4)
EOSINOPHIL NFR BLD: 1 % (ref 0–7)
ERYTHROCYTE [DISTWIDTH] IN BLOOD BY AUTOMATED COUNT: 15.1 % (ref 11.5–14.5)
GLOBULIN SER CALC-MCNC: 3.7 G/DL (ref 2–4)
GLUCOSE SERPL-MCNC: 77 MG/DL (ref 65–100)
HCT VFR BLD AUTO: 41.4 % (ref 35–47)
HGB BLD-MCNC: 14.2 G/DL (ref 11.5–16)
LYMPHOCYTES # BLD AUTO: 39 % (ref 12–49)
LYMPHOCYTES # BLD: 3.4 K/UL (ref 0.8–3.5)
MCH RBC QN AUTO: 30.1 PG (ref 26–34)
MCHC RBC AUTO-ENTMCNC: 34.3 G/DL (ref 30–36.5)
MCV RBC AUTO: 87.9 FL (ref 80–99)
MONOCYTES # BLD: 0.4 K/UL (ref 0–1)
MONOCYTES NFR BLD AUTO: 5 % (ref 5–13)
NEUTS SEG # BLD: 4.8 K/UL (ref 1.8–8)
NEUTS SEG NFR BLD AUTO: 55 % (ref 32–75)
PLATELET # BLD AUTO: 306 K/UL (ref 150–400)
POTASSIUM SERPL-SCNC: 4.2 MMOL/L (ref 3.5–5.1)
PROT SERPL-MCNC: 7.8 G/DL (ref 6.4–8.2)
RBC # BLD AUTO: 4.71 M/UL (ref 3.8–5.2)
SODIUM SERPL-SCNC: 141 MMOL/L (ref 136–145)
TROPONIN I BLD-MCNC: <0.04 NG/ML (ref 0–0.08)
TROPONIN I SERPL-MCNC: <0.04 NG/ML
WBC # BLD AUTO: 8.6 K/UL (ref 3.6–11)

## 2017-05-25 PROCEDURE — 74011250636 HC RX REV CODE- 250/636: Performed by: EMERGENCY MEDICINE

## 2017-05-25 PROCEDURE — C9113 INJ PANTOPRAZOLE SODIUM, VIA: HCPCS | Performed by: EMERGENCY MEDICINE

## 2017-05-25 PROCEDURE — 71020 XR CHEST PA LAT: CPT

## 2017-05-25 PROCEDURE — 85379 FIBRIN DEGRADATION QUANT: CPT | Performed by: EMERGENCY MEDICINE

## 2017-05-25 PROCEDURE — 84484 ASSAY OF TROPONIN QUANT: CPT | Performed by: EMERGENCY MEDICINE

## 2017-05-25 PROCEDURE — 80053 COMPREHEN METABOLIC PANEL: CPT | Performed by: EMERGENCY MEDICINE

## 2017-05-25 PROCEDURE — 99285 EMERGENCY DEPT VISIT HI MDM: CPT

## 2017-05-25 PROCEDURE — 93005 ELECTROCARDIOGRAM TRACING: CPT

## 2017-05-25 PROCEDURE — 74011000250 HC RX REV CODE- 250: Performed by: EMERGENCY MEDICINE

## 2017-05-25 PROCEDURE — 96374 THER/PROPH/DIAG INJ IV PUSH: CPT

## 2017-05-25 PROCEDURE — 74011250637 HC RX REV CODE- 250/637: Performed by: EMERGENCY MEDICINE

## 2017-05-25 PROCEDURE — 82550 ASSAY OF CK (CPK): CPT | Performed by: EMERGENCY MEDICINE

## 2017-05-25 PROCEDURE — 36415 COLL VENOUS BLD VENIPUNCTURE: CPT | Performed by: EMERGENCY MEDICINE

## 2017-05-25 PROCEDURE — 85025 COMPLETE CBC W/AUTO DIFF WBC: CPT | Performed by: EMERGENCY MEDICINE

## 2017-05-25 RX ORDER — OMEPRAZOLE 20 MG/1
20 CAPSULE, DELAYED RELEASE ORAL DAILY
Qty: 14 CAP | Refills: 0 | Status: SHIPPED | OUTPATIENT
Start: 2017-05-25 | End: 2017-06-08

## 2017-05-25 RX ORDER — FAMOTIDINE 20 MG/1
20 TABLET, FILM COATED ORAL
Status: COMPLETED | OUTPATIENT
Start: 2017-05-25 | End: 2017-05-25

## 2017-05-25 RX ORDER — ACETAMINOPHEN 325 MG/1
975 TABLET ORAL
Status: COMPLETED | OUTPATIENT
Start: 2017-05-25 | End: 2017-05-25

## 2017-05-25 RX ORDER — RANITIDINE 150 MG/1
150 TABLET, FILM COATED ORAL
Qty: 14 TAB | Refills: 0 | Status: SHIPPED | OUTPATIENT
Start: 2017-05-25 | End: 2017-06-08

## 2017-05-25 RX ORDER — AMLODIPINE BESYLATE 5 MG/1
5 TABLET ORAL
Status: COMPLETED | OUTPATIENT
Start: 2017-05-25 | End: 2017-05-25

## 2017-05-25 RX ADMIN — AMLODIPINE BESYLATE 5 MG: 5 TABLET ORAL at 20:33

## 2017-05-25 RX ADMIN — SODIUM CHLORIDE 40 MG: 9 INJECTION INTRAMUSCULAR; INTRAVENOUS; SUBCUTANEOUS at 20:14

## 2017-05-25 RX ADMIN — FAMOTIDINE 20 MG: 20 TABLET ORAL at 20:32

## 2017-05-25 RX ADMIN — ACETAMINOPHEN 975 MG: 325 TABLET, FILM COATED ORAL at 20:14

## 2017-05-25 NOTE — ED TRIAGE NOTES
For the past couple of weeks patient has been experiencing increased SOB with activity and talking. Also c/o chest discomfort and intermittent nausea. Patient states \"this has been going on for years but these past couple of weeks it has been way worse. \" Patient also has been experiencing \"fuzzies in the air\" when these episodes occur.

## 2017-05-25 NOTE — LETTER
[address] 
[facility] EMERGENCY DEPT 
[address] [place] [address]. [address] [id number] [phone] Work/School Note Date: 5/25/2017 To Whom It May concern: 
 
aDvid Lemus was seen and treated today in the emergency room by the following provider(s): 
Attending Provider: Mike Mei. Mynor Benjamin 36 may return to work on 5/27/17. Sincerely, Mike Mei.  Zehra Montes MD

## 2017-05-26 ENCOUNTER — PATIENT MESSAGE (OUTPATIENT)
Dept: INTERNAL MEDICINE CLINIC | Facility: CLINIC | Age: 52
End: 2017-05-26

## 2017-05-26 LAB
ATRIAL RATE: 64 BPM
CALCULATED P AXIS, ECG09: 62 DEGREES
CALCULATED R AXIS, ECG10: 11 DEGREES
CALCULATED T AXIS, ECG11: 15 DEGREES
DIAGNOSIS, 93000: NORMAL
P-R INTERVAL, ECG05: 174 MS
Q-T INTERVAL, ECG07: 418 MS
QRS DURATION, ECG06: 74 MS
QTC CALCULATION (BEZET), ECG08: 431 MS
VENTRICULAR RATE, ECG03: 64 BPM

## 2017-05-26 RX ORDER — AMLODIPINE AND BENAZEPRIL HYDROCHLORIDE 5; 20 MG/1; MG/1
1 CAPSULE ORAL DAILY
Qty: 30 CAP | Refills: 1 | Status: SHIPPED | OUTPATIENT
Start: 2017-05-26 | End: 2017-07-24 | Stop reason: SDUPTHER

## 2017-05-26 NOTE — ED PROVIDER NOTES
HPI Comments: Devika Zavala is a 49 y.o. female with h/o GERD, HTN, chronic fatigue and fibromyalgia who presents ambulatory to the ED c/o non radiating dull achy L sided CP with associated dizziness which has been occurring intermittently for a couple months. Pt reports she called her cardiologist's office today and made an appointment for 6/15 with Dr. Piyush Thibodeaux. She then called her PCP who referred her to the ED for further evaluation. She states her episodes of CP and dizziness last only a couple of seconds, occur a couple times a day, but she is unsure of the trigger. Pt denies her CP feeling like her pain associated with her acid reflux. Pt takes Amlodipine 5mg every night, which she took last night. She also states taking Ibuprofen daily for her chronic pain. Pt denies SOB or becoming SOB upon exertion. Pt denies taking any medication for acid reflux. Pt denies h/o blood clots, being on any hormone therapy, any recent long distance travel or recent surgeries. Pt specifically denies UE or jaw pain, hemoptysis, cough, abd pain, nausea, vomiting, fever, chills or any urinary sxs. PCP: Reynaldo Moses PA-C  Cardiologist: Dr. Gaby Gama Hx: -tobacco, -EtOH, -Illicit Drugs      There are no other complaints, changes or physical findings at this time. The history is provided by the patient. Past Medical History:   Diagnosis Date    Abdominal abscess ([address]     Contact dermatitis and other eczema, due to unspecified cause     Depression     Dyslipidemia     Headache     History of mammography, screening 2013    Normal     Hypertension     Lumbar stenosis 2012    Pleurisy     Psychiatric disorder     DEPRESSION.     Scoliosis        Past Surgical History:   Procedure Laterality Date    ABDOMEN SURGERY PROC UNLISTED      abdominal abscess drainage    ENDOSCOPY, COLON, DIAGNOSTIC      HX COLONOSCOPY      HX ENDOSCOPY      HX GI      COLONOSCOPY X 1    HX GYN       , tubal ligation 1993    HX GYN      hysterectomy    HX ORTHOPAEDIC      ganglian cyst removed from right foot 2000 & 2001         Family History:   Problem Relation Age of Onset   Elsy Carbdanielito Diabetes Mother     Thyroid Disease Mother     Thyroid Disease Father     Hypertension Father     Asthma Sister     MS Sister     Thyroid Disease Sister        Social History     Social History    Marital status:      Spouse name: N/A    Number of children: N/A    Years of education: N/A     Occupational History    Not on file. Social History Main Topics    Smoking status: Former Smoker     Quit date: 9/12/2004    Smokeless tobacco: Never Used    Alcohol use 0.0 oz/week      Comment: 1 or 2 glass of wine a month     Drug use: No    Sexual activity: Yes     Partners: Male      Comment: single     Other Topics Concern    Not on file     Social History Narrative         ALLERGIES: Egg; Sulfa (sulfonamide antibiotics); Broccoli; Chicken derived; Dairy aid [lactase]; Doxycycline; Gluten; Pravastatin; Tetracycline; and Wheat    Review of Systems   Constitutional: Negative for chills and fever. HENT: Negative for congestion. Eyes: Negative for visual disturbance. Respiratory: Negative for cough, chest tightness and shortness of breath. Denies hemoptysis   Cardiovascular: Positive for chest pain (reoccurring, dull, achy, non radiating, L sided). Negative for leg swelling. Gastrointestinal: Negative for abdominal pain, diarrhea, nausea and vomiting. Endocrine: Negative for polyuria. Genitourinary: Negative for dysuria, frequency and hematuria. Musculoskeletal: Negative for myalgias. Skin: Negative for color change. Allergic/Immunologic: Negative for immunocompromised state. Neurological: Positive for dizziness. Negative for numbness. All other systems reviewed and are negative.       Vitals:    05/25/17 1930 05/25/17 2000 05/25/17 2030 05/25/17 2032   BP: (!) 162/112 (!) 142/117 (!) 119/106 (!) 119/106   Pulse: 66 70 64 62   Resp:       Temp:       SpO2:  100% 92%    Weight:       Height:                Physical Exam     Nursing note and vitals reviewed. General appearance: non-toxic, NAD  Eyes: PERRL, EOMI, conjunctiva normal, anicteric sclera  HEENT: mucous membranes moist, oropharynx is clear  Pulmonary: clear to auscultation bilaterally,  Cardiac: normal rate and regular rhythm, no murmurs, gallops, or rubs, 2+DP pulses, 2+ radial pulses  Abdomen: soft, nontender, nondistended, bowel sounds present  MSK: no pre-tibial edema, mild TTP to L anterior chest wall, no pain with supine vs sitting, mild reproducible thoracic pain with torso rotation  Neuro: Alert, answers questions appropriately  Skin: capillary refill brisk    MDM  Number of Diagnoses or Management Options  Chest pain, unspecified type:   Diagnosis management comments:   DDx: NSAID associated gastritis, PUD, pancreatitis, MSK CP, low suspicion for ACS (HEART score 2), PE, pericarditis. Amount and/or Complexity of Data Reviewed  Clinical lab tests: ordered and reviewed  Tests in the radiology section of CPT®: ordered and reviewed  Tests in the medicine section of CPT®: ordered and reviewed  Review and summarize past medical records: yes  Independent visualization of images, tracings, or specimens: yes    Patient Progress  Patient progress: stable    ED Course       Procedures    EKG interpretation: (Preliminary) 18:43  Rhythm: normal sinus rhythm; and regular . Rate (approx.): 64 bpm; Axis: normal; NE interval: 174 ms; QRS interval: 74 ms; QT/QTc: 418/431 ms  Written by Arely Tineo ED Scribe, as dictated by Daniel Hernandez MD.    10:16 PM   No sxs or pain with ambulation. Agreeable with outpatient follow up.     LABORATORY TESTS:  Recent Results (from the past 12 hour(s))   EKG, 12 LEAD, INITIAL    Collection Time: 05/25/17  6:43 PM   Result Value Ref Range    Ventricular Rate 64 BPM    Atrial Rate 64 BPM    P-R Interval 174 ms    QRS Duration 74 ms    Q-T Interval 418 ms    QTC Calculation (Bezet) 431 ms    Calculated P Axis 62 degrees    Calculated R Axis 11 degrees    Calculated T Axis 15 degrees    Diagnosis       Normal sinus rhythm  Possible Left atrial enlargement  When compared with ECG of 25-MAY-2015 13:51,  No significant change was found     CBC WITH AUTOMATED DIFF    Collection Time: 05/25/17  6:55 PM   Result Value Ref Range    WBC 8.6 3.6 - 11.0 K/uL    RBC 4.71 3.80 - 5.20 M/uL    HGB 14.2 11.5 - 16.0 g/dL    HCT 41.4 35.0 - 47.0 %    MCV 87.9 80.0 - 99.0 FL    MCH 30.1 26.0 - 34.0 PG    MCHC 34.3 30.0 - 36.5 g/dL    RDW 15.1 (H) 11.5 - 14.5 %    PLATELET 015 534 - 498 K/uL    NEUTROPHILS 55 32 - 75 %    LYMPHOCYTES 39 12 - 49 %    MONOCYTES 5 5 - 13 %    EOSINOPHILS 1 0 - 7 %    BASOPHILS 0 0 - 1 %    ABS. NEUTROPHILS 4.8 1.8 - 8.0 K/UL    ABS. LYMPHOCYTES 3.4 0.8 - 3.5 K/UL    ABS. MONOCYTES 0.4 0.0 - 1.0 K/UL    ABS. EOSINOPHILS 0.1 0.0 - 0.4 K/UL    ABS. BASOPHILS 0.0 0.0 - 0.1 K/UL   METABOLIC PANEL, COMPREHENSIVE    Collection Time: 05/25/17  6:55 PM   Result Value Ref Range    Sodium 141 136 - 145 mmol/L    Potassium 4.2 3.5 - 5.1 mmol/L    Chloride 106 97 - 108 mmol/L    CO2 30 21 - 32 mmol/L    Anion gap 5 5 - 15 mmol/L    Glucose 77 65 - 100 mg/dL    BUN 14 6 - 20 MG/DL    Creatinine 0.77 0.55 - 1.02 MG/DL    BUN/Creatinine ratio 18 12 - 20      GFR est AA >60 >60 ml/min/1.73m2    GFR est non-AA >60 >60 ml/min/1.73m2    Calcium 9.1 8.5 - 10.1 MG/DL    Bilirubin, total 0.4 0.2 - 1.0 MG/DL    ALT (SGPT) 19 12 - 78 U/L    AST (SGOT) 7 (L) 15 - 37 U/L    Alk.  phosphatase 58 45 - 117 U/L    Protein, total 7.8 6.4 - 8.2 g/dL    Albumin 4.1 3.5 - 5.0 g/dL    Globulin 3.7 2.0 - 4.0 g/dL    A-G Ratio 1.1 1.1 - 2.2     TROPONIN I    Collection Time: 05/25/17  6:55 PM   Result Value Ref Range    Troponin-I, Qt. <0.04 <0.05 ng/mL   CK W/ REFLX CKMB    Collection Time: 05/25/17  6:55 PM   Result Value Ref Range    CK 93 26 - 192 U/L   D DIMER    Collection Time: 05/25/17  6:55 PM   Result Value Ref Range    D-dimer 0.22 0.00 - 0.65 mg/L FEU   POC TROPONIN-I    Collection Time: 05/25/17  9:48 PM   Result Value Ref Range    Troponin-I (POC) <0.04 0.00 - 0.08 ng/mL       IMAGING RESULTS:    XR CHEST PA LAT  INDICATION: cp and shortness of breath for a couple of weeks     COMPARISON: May 25, 2015     FINDINGS:     Frontal and lateral views of the chest demonstrate a normal cardiomediastinal  silhouette. The lungs are adequately expanded. There is no edema, effusion,  consolidation, or pneumothorax. Dextroconvex scoliosis of the spine.     IMPRESSION  IMPRESSION:  No acute process. MEDICATIONS GIVEN:  Medications   acetaminophen (TYLENOL) tablet 975 mg (975 mg Oral Given 5/25/17 2014)   pantoprazole (PROTONIX) 40 mg in sodium chloride 0.9 % 10 mL injection (40 mg IntraVENous Given 5/25/17 2014)   famotidine (PEPCID) tablet 20 mg (20 mg Oral Given 5/25/17 2032)   amLODIPine (NORVASC) tablet 5 mg (5 mg Oral Given 5/25/17 2033)       IMPRESSION:  1. Chest pain, unspecified type        PLAN:  1. Current Discharge Medication List      START taking these medications    Details   raNITIdine (ZANTAC) 150 mg tablet Take 1 Tab by mouth nightly for 14 days. Qty: 14 Tab, Refills: 0      omeprazole (PRILOSEC) 20 mg capsule Take 1 Cap by mouth daily for 14 days. Indications: gastroesophageal reflux disease  Qty: 14 Cap, Refills: 0         CONTINUE these medications which have NOT CHANGED    Details   gabapentin (NEURONTIN) 300 mg capsule Take 1 Cap by mouth three (3) times daily. Qty: 90 Cap, Refills: 5    Associated Diagnoses: Chronic bilateral low back pain with bilateral sciatica; Fibromyalgia      amLODIPine (NORVASC) 5 mg tablet Take 1 Tab by mouth daily. Indications: hypertension  Qty: 30 Tab, Refills: 5    Associated Diagnoses: Essential hypertension      cetirizine (ZYRTEC) 10 mg tablet Take 1 Tab by mouth nightly.   Qty: 30 Tab, Refills: 11    Associated Diagnoses: Seasonal allergic rhinitis, unspecified allergic rhinitis trigger      ibuprofen (MOTRIN) 800 mg tablet Take 1 Tab by mouth every six (6) hours as needed for Pain (take with food). Qty: 60 Tab, Refills: 5    Associated Diagnoses: Chronic bilateral low back pain with left-sided sciatica      methocarbamol (ROBAXIN) 500 mg tablet Take 1 Tab by mouth every six (6) hours as needed (mm spasm). Qty: 15 Tab, Refills: 0      traMADol (ULTRAM) 50 mg tablet Take 1 Tab by mouth every six (6) hours as needed for Pain. Max Daily Amount: 200 mg. Qty: 60 Tab, Refills: 1    Associated Diagnoses: Chronic bilateral low back pain with bilateral sciatica      EPINEPHrine (EPIPEN) 0.3 mg/0.3 mL injection 0.3 mL by IntraMUSCular route once as needed for up to 1 dose. Qty: 1 Syringe, Refills: 1    Associated Diagnoses: Allergy with anaphylaxis due to eggs           2. Follow-up Information     Follow up With Details Comments Contact Info    Merced Frost PA-C Schedule an appointment as soon as possible for a visit in 3 days  [address] [facility]  [phone]      \[facility] [place] [facility]\"" EMERGENCY DEPT Go in 1 day If symptoms worsen [address]  [facility] [address]   [address] Char Tolentino MD Schedule an appointment as soon as possible for a visit in 1 day [address] [facility] [phone]          Return to ED if worse     DISCHARGE NOTE  10:15 PM  The patient has been re-evaluated and is ready for discharge. Reviewed available results with patient. Counseled pt on diagnosis and care plan. Pt has expressed understanding, and all questions have been answered. Pt agrees with plan and agrees to F/U as recommended, or return to the ED if their sxs worsen. Discharge instructions have been provided and explained to the pt, along with reasons to return to the ED.   Written by Aiden Tomas Aria Estes, JARAD Scribe, as dictated by Rachana Krishnan MD.    This note is prepared by Yvonne Manjarrez, acting as Scribe for Rachana Krishnan MD.    Rachana Krishnan MD.: The scribe's documentation has been prepared under my direction and personally reviewed by me in its entirety. I confirm that the note above accurately reflects all work, treatment, procedures, and medical decision making performed by me.

## 2017-05-26 NOTE — TELEPHONE ENCOUNTER
From: [address]  To: Alicia Butler PA-C  Sent: 5/26/2017 8:33 AM EDT  Subject: Non-Urgent Medical Question    Good morning. Went to ER last night. I wanted to know if you want to change my BP med before I  the one you just refilled. ER doc def wants me to follow up with the cardiologist sooner rather than later, but I can't go before the 5th as I need a $50 co-payment. I took pictures of the monitors for you. Highest was 142/117, lowest was when the bottom number was less than 99 and they let me come home. Not sure what they gave me (Tylenol, and three other pills and something in my arm). I do feel better, but this morning just a little light headed. No chest pains or seeing stars. MUCH better than yesterday.  Thx.

## 2017-05-26 NOTE — DISCHARGE INSTRUCTIONS
Chest Pain: Care Instructions  Your Care Instructions  There are many things that can cause chest pain. Some are not serious and will get better on their own in a few days. But some kinds of chest pain need more testing and treatment. Your doctor may have recommended a follow-up visit in the next 8 to 12 hours. If you are not getting better, you may need more tests or treatment. Even though your doctor has released you, you still need to watch for any problems. The doctor carefully checked you, but sometimes problems can develop later. If you have new symptoms or if your symptoms do not get better, get medical care right away. If you have worse or different chest pain or pressure that lasts more than 5 minutes or you passed out (lost consciousness), call 911 or seek other emergency help right away. A medical visit is only one step in your treatment. Even if you feel better, you still need to do what your doctor recommends, such as going to all suggested follow-up appointments and taking medicines exactly as directed. This will help you recover and help prevent future problems. How can you care for yourself at home? · Rest until you feel better. · Take your medicine exactly as prescribed. Call your doctor if you think you are having a problem with your medicine. · Do not drive after taking a prescription pain medicine. When should you call for help? Call 911 if:  · You passed out (lost consciousness). · You have severe difficulty breathing. · You have symptoms of a heart attack. These may include:  ¨ Chest pain or pressure, or a strange feeling in your chest.  ¨ Sweating. ¨ Shortness of breath. ¨ Nausea or vomiting. ¨ Pain, pressure, or a strange feeling in your back, neck, jaw, or upper belly or in one or both shoulders or arms. ¨ Lightheadedness or sudden weakness. ¨ A fast or irregular heartbeat.   After you call 911, the  may tell you to chew 1 adult-strength or 2 to 4 low-dose aspirin. Wait for an ambulance. Do not try to drive yourself. Call your doctor today if:  · You have any trouble breathing. · Your chest pain gets worse. · You are dizzy or lightheaded, or you feel like you may faint. · You are not getting better as expected. · You are having new or different chest pain. Where can you learn more? Go to http://sunshine-kandi.info/. Enter A120 in the search box to learn more about \"Chest Pain: Care Instructions. \"  Current as of: May 27, 2016  Content Version: 11.2  © 7113-3292 [employer]. Care instructions adapted under license by [employer] (which disclaims liability or warranty for this information). If you have questions about a medical condition or this instruction, always ask your healthcare professional. [address] any warranty or liability for your use of this information.

## 2017-06-05 ENCOUNTER — OFFICE VISIT (OUTPATIENT)
Dept: CARDIOLOGY CLINIC | Age: 52
End: 2017-06-05

## 2017-06-05 VITALS
WEIGHT: 213.4 LBS | SYSTOLIC BLOOD PRESSURE: 134 MMHG | HEIGHT: 66 IN | DIASTOLIC BLOOD PRESSURE: 80 MMHG | HEART RATE: 66 BPM | BODY MASS INDEX: 34.3 KG/M2

## 2017-06-05 DIAGNOSIS — G47.33 OSA ON CPAP: ICD-10-CM

## 2017-06-05 DIAGNOSIS — I10 ESSENTIAL HYPERTENSION: ICD-10-CM

## 2017-06-05 DIAGNOSIS — R07.2 PRECORDIAL PAIN: Primary | ICD-10-CM

## 2017-06-05 DIAGNOSIS — Z01.810 PREOP CARDIOVASCULAR EXAM: ICD-10-CM

## 2017-06-05 DIAGNOSIS — E78.2 MIXED HYPERLIPIDEMIA: ICD-10-CM

## 2017-06-05 DIAGNOSIS — F41.9 ANXIETY: ICD-10-CM

## 2017-06-05 DIAGNOSIS — R55 NEAR SYNCOPE: ICD-10-CM

## 2017-06-05 DIAGNOSIS — Z99.89 OSA ON CPAP: ICD-10-CM

## 2017-06-05 DIAGNOSIS — R00.2 PALPITATION: ICD-10-CM

## 2017-06-05 RX ORDER — FLUOCINONIDE 0.5 MG/G
CREAM TOPICAL AS NEEDED
COMMUNITY
Start: 2017-05-24 | End: 2017-07-24 | Stop reason: SDUPTHER

## 2017-06-05 NOTE — PROGRESS NOTES
HISTORY OF PRESENT ILLNESS  Lilli Santillan is a 49 y.o. female     SUMMARY:   Problem List  Date Reviewed: 6/5/2017          Codes Class Noted    Chronic bilateral low back pain with left-sided sciatica ICD-10-CM: M54.42, G89.29  ICD-9-CM: 724.2, 724.3, 338.29  4/19/2017        Essential hypertension ICD-10-CM: I10  ICD-9-CM: 401.9  4/19/2017        Allergy with anaphylaxis due to eggs ICD-10-CM: T78.08XA  ICD-9-CM: 995.68  3/1/2017        Exercise-induced asthma ICD-10-CM: J45.990  ICD-9-CM: 493.81  3/1/2017        Fibromyalgia ICD-10-CM: M79.7  ICD-9-CM: 729.1  3/1/2016        Elevated BP ICD-10-CM: KVD2274  ICD-9-CM: Ardean Flatter  3/1/2016        History of hysterectomy for benign disease ICD-10-CM: Z90.710  ICD-9-CM: V88.01  3/1/2016        Hyperlipidemia ICD-10-CM: E78.5  ICD-9-CM: 272.4  3/1/2016        History of lumbar laminectomy ICD-10-CM: Z98.890  ICD-9-CM: V45.89  3/1/2016        JENNIE on CPAP ICD-10-CM: G47.33, Z99.89  ICD-9-CM: 327.23, V46.8  3/1/2016        Depression with anxiety ICD-10-CM: F41.8  ICD-9-CM: 300.4  3/1/2016    Overview Signed 3/1/2016  9:42 AM by Arnaldo Thomas MD     h/o self harm as teenager             Obesity ICD-10-CM: E66.9  ICD-9-CM: 278.00  10/7/2012        Precordial pain ICD-10-CM: R07.2  ICD-9-CM: 786.51  10/7/2012    Overview Addendum 6/4/2017  1:17 PM by Cristina Galeano MD     11/13 lexiscan cardiolyte, apical ischemia  11/13 normal cardiac cath (false positive nuclear stress test)             Thyroid nodule ICD-10-CM: E04.1  ICD-9-CM: 241.0  4/19/2011              Current Outpatient Prescriptions on File Prior to Visit   Medication Sig    amLODIPine-benazepril (LOTREL) 5-20 mg per capsule Take 1 Cap by mouth daily.  gabapentin (NEURONTIN) 300 mg capsule Take 1 Cap by mouth three (3) times daily.  cetirizine (ZYRTEC) 10 mg tablet Take 1 Tab by mouth nightly.     ibuprofen (MOTRIN) 800 mg tablet Take 1 Tab by mouth every six (6) hours as needed for Pain (take with food).  methocarbamol (ROBAXIN) 500 mg tablet Take 1 Tab by mouth every six (6) hours as needed (mm spasm).  EPINEPHrine (EPIPEN) 0.3 mg/0.3 mL injection 0.3 mL by IntraMUSCular route once as needed for up to 1 dose.  traMADol (ULTRAM) 50 mg tablet Take 1 Tab by mouth every six (6) hours as needed for Pain. Max Daily Amount: 200 mg.    raNITIdine (ZANTAC) 150 mg tablet Take 1 Tab by mouth nightly for 14 days.  omeprazole (PRILOSEC) 20 mg capsule Take 1 Cap by mouth daily for 14 days. Indications: gastroesophageal reflux disease     No current facility-administered medications on file prior to visit. CARDIOLOGY STUDIES TO DATE:  11/13 lexiscan cardiolyte, apical ischemia  11/13 normal cardiac cath (false positive nuclear stress test)    Chief Complaint   Patient presents with    Chest Pain     HPI :  Ms. Kim Bullard is a 49year old referred by her PCP for cardiac evaluation. Almost four years ago she had some chest pain, an abnormal nuclear, and then a normal cath for chest pain and since then has continued to have problems with intermittent, nonexertional, sharp chest pain lasting a few seconds without associated symptoms. She is not able to exercise because of fibromyalgia and chronic back pain and sciatica. She has longstanding hypertension and mild hyperlipidemia. She does not smoke, there is no history of diabetes, and family history is negative for premature coronary disease. She has occasional palpitations without associated symptoms which she has had for years, and she also has occasional episodes where she gets dizzy and almost describes it like she might faint or pass out. Sometimes this is when she gets up, and sometimes it is when she is sitting still. She thinks it may be related to some of her medications, and I agree that is a good possibility. She ended up in the ER about a week ago and had a negative work-up including a normal EKG which I have reviewed.      CARDIAC ROS: negative for dyspnea, syncope, orthopnea, paroxysmal nocturnal dyspnea, exertional chest pressure/discomfort, claudication, lower extremity edema    Family History   Problem Relation Age of Onset    Diabetes Mother     Thyroid Disease Mother     Thyroid Disease Father     Hypertension Father     Asthma Sister     MS Sister     Thyroid Disease Sister        Past Medical History:   Diagnosis Date    Abdominal abscess ([address]     Contact dermatitis and other eczema, due to unspecified cause     Depression     Dyslipidemia     Headache     History of mammography, screening 12/2013    Normal     Hypertension     Lumbar stenosis july 2012    Pleurisy     Psychiatric disorder     DEPRESSION.  Scoliosis        GENERAL ROS:  A comprehensive review of systems was negative except for that written in the HPI. Visit Vitals    /80 (BP 1 Location: Left arm, BP Patient Position: Sitting)    Pulse 66    Ht 5' 6\" (1.676 m)    Wt 213 lb 6.4 oz (96.8 kg)    LMP 11/03/2010    BMI 34.44 kg/m2       Wt Readings from Last 3 Encounters:   06/05/17 213 lb 6.4 oz (96.8 kg)   05/25/17 210 lb (95.3 kg)   04/19/17 211 lb 4.8 oz (95.8 kg)            BP Readings from Last 3 Encounters:   06/05/17 134/80   05/25/17 (!) 150/99   04/19/17 (!) 144/92       PHYSICAL EXAM  General appearance: alert, cooperative, no distress, appears stated age  Neurologic: Alert and oriented X 3  Neck: supple, symmetrical, trachea midline, no adenopathy, no carotid bruit and no JVD  Lungs: clear to auscultation bilaterally  Heart: regular rate and rhythm, S1, S2 normal, no murmur, click, rub or gallop  Abdomen: soft, non-tender.  Bowel sounds normal. No masses,  no organomegaly  Extremities: extremities normal, atraumatic, no cyanosis or edema  Pulses: 2+ and symmetric    Lab Results   Component Value Date/Time    Cholesterol, total 198 03/01/2016 09:55 AM    Cholesterol, total 166 01/02/2014 08:46 AM    Cholesterol, total 205 11/19/2013 11:18 PM    Cholesterol, total 224 09/30/2013 10:35 AM    Cholesterol, total 231 07/02/2012 05:17 PM    HDL Cholesterol 60 03/01/2016 09:55 AM    HDL Cholesterol 57 01/02/2014 08:46 AM    HDL Cholesterol 49 11/19/2013 11:18 PM    HDL Cholesterol 58 09/30/2013 10:35 AM    HDL Cholesterol 57 07/02/2012 05:17 PM    LDL, calculated 119 03/01/2016 09:55 AM    LDL, calculated 93 01/02/2014 08:46 AM    LDL, calculated 132 11/19/2013 11:18 PM    LDL, calculated 143 09/30/2013 10:35 AM    LDL, calculated 114 07/02/2012 05:17 PM    Triglyceride 93 03/01/2016 09:55 AM    Triglyceride 80 01/02/2014 08:46 AM    Triglyceride 120 11/19/2013 11:18 PM    Triglyceride 115 09/30/2013 10:35 AM    Triglyceride 298 07/02/2012 05:17 PM    CHOL/HDL Ratio 2.9 01/02/2014 08:46 AM    CHOL/HDL Ratio 4.2 11/19/2013 11:18 PM    CHOL/HDL Ratio 3.9 09/30/2013 10:35 AM     ASSESSMENT  Ms. Gina Flores chest pain is not consistent with a cardiac etiology, and I explained to her the fact that she had a normal cath less than four years ago under these circumstances and given her risk factors was very reassuring. It sounds like she may be having some orthostasis, but it is also possible that she is having side-effects from either Robaxin or gabapentin, and I strongly encouraged her to talk to her PCP about tapering these medications because she is not sure they are even helping her in terms of her neurologic and orthopedic issues. She is contemplating back surgery, and from a cardiac standpoint this can proceed without special precautions or further cardiac testing. current treatment plan is effective, no change in therapy  lab results and schedule of future lab studies reviewed with patient  reviewed diet, exercise and weight control    Encounter Diagnoses   Name Primary?     Precordial pain Yes    Essential hypertension     Mixed hyperlipidemia     JENNIE on CPAP     Near syncope     Palpitation     Anxiety     Preop cardiovascular exam      Orders Placed This Encounter    fluocinoNIDE (LIDEX) 0.05 % topical cream       Follow-up Disposition:  Return if symptoms worsen or fail to improve.     Nicki Gonzalez MD  6/5/2017

## 2017-06-05 NOTE — MR AVS SNAPSHOT
Visit Information Date & Time Provider Department Dept. Phone Encounter #  
 6/5/2017 10:00 AM Amira Aguirre MD [facility] [phone] [id number] Follow-up Instructions Return if symptoms worsen or fail to improve. Your Appointments 6/5/2017 10:00 AM  
New Patient with Amira Aguirre MD  
[facility] ([facility]) Appt Note: former patient dx ocassional sob and dizziness clearance for lapband surgery; former patient dx ocassional sob and dizziness clearance for lapband surgery [address] [facility] [address]  
[address] [address]  
  
    
 6/19/2017  8:15 AM  
PHYSICAL PRE OP with LATHA Riley [facility] ([facility]) Appt Note: complete physical; r/s 05/04/2017 DG; r/s 05/05/2017 DG  
 [place]  
  
   
 [facility] Upcoming Health Maintenance Date Due Pneumococcal 19-64 Medium Risk (1 of 1 - PPSV23) 9/16/1985 DTaP/Tdap/Td series (1 - Tdap) 9/16/1987 FOBT Q 1 YEAR AGE 50-75 9/16/2016 INFLUENZA AGE 9 TO ADULT 8/1/2017 BREAST CANCER SCRN MAMMOGRAM 3/3/2019 PAP AKA CERVICAL CYTOLOGY 3/1/2020 Allergies as of 6/5/2017  Review Complete On: 6/5/2017 By: Amira Aguirre MD  
  
 Severity Noted Reaction Type Reactions Egg High 01/28/2011    Shortness of Breath, Nausea and Vomiting  
 Sulfa (Sulfonamide Antibiotics) High 11/12/2010    Anaphylaxis, Shortness of Breath, Swelling Broccoli  01/28/2011    Rash, Itching Chicken Derived  02/11/2013    Nausea and Vomiting Dairy Aid [Lactase]  01/28/2011    Swelling Doxycycline  11/12/2010    Unknown (comments) Gluten  01/28/2011    Swelling Pravastatin  09/04/2012    Myalgia, Other (comments) Blurry vision Tetracycline  11/12/2010    Unknown (comments) Wheat  01/28/2011    Nausea and Vomiting Current Immunizations  Reviewed on 11/22/2013 Name Date Influenza Vaccine PF  Deferred (Patient Refused) Pneumococcal Conjugate (PCV-13)  Deferred (Patient Refused) Not reviewed this visit You Were Diagnosed With   
  
 Codes Comments Essential hypertension    -  Primary ICD-10-CM: I10 
ICD-9-CM: 401.9 Mixed hyperlipidemia     ICD-10-CM: E78.2 ICD-9-CM: 272.2 Morbid obesity due to excess calories (HCC)     ICD-10-CM: E66.01 
ICD-9-CM: 278.01   
 JENNIE on CPAP     ICD-10-CM: G47.33, Z99.89 ICD-9-CM: 327.23, V46.8 Vitals BP Pulse Height(growth percentile) Weight(growth percentile) LMP BMI  
 134/80 (BP 1 Location: Left arm, BP Patient Position: Sitting) 66 5' 6\" (1.676 m) 213 lb 6.4 oz (96.8 kg) 11/03/2010 34.44 kg/m2 OB Status Smoking Status Hysterectomy Former Smoker Vitals History BMI and BSA Data Body Mass Index Body Surface Area 34.44 kg/m 2 2.12 m 2 Preferred Pharmacy Pharmacy Name Phone [facility] [address] , [address] [place] [address] [phone] Your Updated Medication List  
  
   
This list is accurate as of: 6/5/17  8:50 AM.  Always use your most recent med list. amLODIPine-benazepril 5-20 mg per capsule Commonly known as:  Mehrdad Roes Take 1 Cap by mouth daily. cetirizine 10 mg tablet Commonly known as:  ZYRTEC Take 1 Tab by mouth nightly. EPINEPHrine 0.3 mg/0.3 mL injection Commonly known as:  EPIPEN  
0.3 mL by IntraMUSCular route once as needed for up to 1 dose. fluocinoNIDE 0.05 % topical cream  
Commonly known as:  LIDEX  
as needed. gabapentin 300 mg capsule Commonly known as:  NEURONTIN Take 1 Cap by mouth three (3) times daily. ibuprofen 800 mg tablet Commonly known as:  MOTRIN  
 Take 1 Tab by mouth every six (6) hours as needed for Pain (take with food). methocarbamol 500 mg tablet Commonly known as:  ROBAXIN Take 1 Tab by mouth every six (6) hours as needed (mm spasm). omeprazole 20 mg capsule Commonly known as:  PRILOSEC Take 1 Cap by mouth daily for 14 days. Indications: gastroesophageal reflux disease  
  
 raNITIdine 150 mg tablet Commonly known as:  ZANTAC Take 1 Tab by mouth nightly for 14 days. traMADol 50 mg tablet Commonly known as:  ULTRAM  
Take 1 Tab by mouth every six (6) hours as needed for Pain. Max Daily Amount: 200 mg. Follow-up Instructions Return if symptoms worsen or fail to improve. Introducing [facility]! Dear Kamla Patches: Thank you for requesting a [facility] account. Our records indicate that you already have an active [facility] account. You can access your account anytime at [email or web address] Did you know that you can access your hospital and ER discharge instructions at any time in [facility]? You can also review all of your test results from your hospital stay or ER visit. Additional Information If you have questions, please visit the Frequently Asked Questions section of the [facility] website at [email or web address] Remember, [facility] is NOT to be used for urgent needs. For medical emergencies, dial 911. Now available from your iPhone and Android! Please provide this summary of care documentation to your next provider. Your primary care clinician is listed as Janet Pires. If you have any questions after today's visit, please call [phone].

## 2017-07-05 ENCOUNTER — OFFICE VISIT (OUTPATIENT)
Dept: INTERNAL MEDICINE CLINIC | Facility: CLINIC | Age: 52
End: 2017-07-05

## 2017-07-05 VITALS
HEART RATE: 65 BPM | HEIGHT: 66 IN | SYSTOLIC BLOOD PRESSURE: 129 MMHG | OXYGEN SATURATION: 98 % | RESPIRATION RATE: 18 BRPM | TEMPERATURE: 98.1 F | BODY MASS INDEX: 34.2 KG/M2 | DIASTOLIC BLOOD PRESSURE: 87 MMHG | WEIGHT: 212.8 LBS

## 2017-07-05 DIAGNOSIS — I10 ESSENTIAL HYPERTENSION: ICD-10-CM

## 2017-07-05 DIAGNOSIS — G47.33 OSA ON CPAP: ICD-10-CM

## 2017-07-05 DIAGNOSIS — F41.8 DEPRESSION WITH ANXIETY: ICD-10-CM

## 2017-07-05 DIAGNOSIS — Z23 ENCOUNTER FOR IMMUNIZATION: ICD-10-CM

## 2017-07-05 DIAGNOSIS — Z00.00 ANNUAL PHYSICAL EXAM: Primary | ICD-10-CM

## 2017-07-05 DIAGNOSIS — Z12.11 SCREEN FOR COLON CANCER: ICD-10-CM

## 2017-07-05 DIAGNOSIS — H53.9 VISION CHANGES: ICD-10-CM

## 2017-07-05 DIAGNOSIS — Z99.89 OSA ON CPAP: ICD-10-CM

## 2017-07-05 DIAGNOSIS — M79.7 FIBROMYALGIA: ICD-10-CM

## 2017-07-05 RX ORDER — DULOXETIN HYDROCHLORIDE 30 MG/1
30 CAPSULE, DELAYED RELEASE ORAL 2 TIMES DAILY
Qty: 60 CAP | Refills: 2 | Status: SHIPPED | OUTPATIENT
Start: 2017-07-05 | End: 2017-07-06

## 2017-07-05 NOTE — PROGRESS NOTES
Room 8    Chief Complaint   Patient presents with    Complete Physical     Patient denies Pap today     1. Have you been to the ER, urgent care clinic since your last visit? Hospitalized since your last visit? No    2. Have you seen or consulted any other health care providers outside of the [address] [place] [address] since your last visit? Include any pap smears or colon screening.  No     Health Maintenance Due   Topic Date Due    Pneumococcal 19-64 Medium Risk (1 of 1 - PPSV23) 09/16/1985    DTaP/Tdap/Td series (1 - Tdap) 09/16/1987    FOBT Q 1 YEAR AGE 50-75  09/16/2016

## 2017-07-05 NOTE — PROGRESS NOTES
HISTORY OF PRESENT ILLNESS  Jayson Hampton is a 49 y.o. female. HPI  1) CE today - fastng for labs. Due for Colonoscopy. Needs referral.     2) Fibromyalgia - pt has been weaning off of Gabapentin per cardiology recommendation because of side effects (palpitations, chest pain - evaluated by cardiology). Taking Tramadol occasionally but not every day. Has had evaluation with rheumatology. Tried Cymbalta for depression in the past, but it was not helpful for depression. Would like to try either Lyrica or Cymbalta now. 3) Sleep Apnea - frequently not wearing CPAP at night. Did not previously understand risk of stroke with untreated Sleep Apnea. (Educated today). 4) Anxious - worse recently. Crying easily. 5) Vision Changes recently - would like eye doctor recommendation. Review of Systems   Constitutional: Positive for malaise/fatigue. Negative for fever and weight loss. HENT: Negative for congestion, hearing loss and sore throat. Eyes: Negative for blurred vision. Respiratory: Negative for cough and shortness of breath. Cardiovascular: Positive for chest pain and palpitations. Negative for leg swelling. Gastrointestinal: Negative for abdominal pain, blood in stool, constipation, diarrhea, heartburn, melena, nausea and vomiting. Genitourinary: Negative for dysuria, frequency, hematuria and urgency. Musculoskeletal: Positive for myalgias. Negative for back pain, joint pain and neck pain. Neurological: Negative for dizziness, seizures, loss of consciousness, weakness and headaches. Endo/Heme/Allergies: Negative for environmental allergies. Psychiatric/Behavioral: Positive for depression. Negative for substance abuse and suicidal ideas. The patient is nervous/anxious. The patient does not have insomnia. Physical Exam   Constitutional: She is oriented to person, place, and time. She appears well-developed and well-nourished. No distress.    HENT:   Head: Normocephalic and atraumatic. Right Ear: External ear normal.   Left Ear: External ear normal.   Nose: Nose normal.   Mouth/Throat: Oropharynx is clear and moist.   Eyes: Conjunctivae are normal.   Neck: Neck supple. No JVD present. No thyromegaly present. Cardiovascular: Normal rate, regular rhythm and normal heart sounds. Pulmonary/Chest: Effort normal and breath sounds normal. No respiratory distress. Breast symmetrical B without mass, tenderness, or discharge. No lymph enlargement in B underarms. Abdominal: Soft. Bowel sounds are normal. She exhibits no distension and no mass. There is no tenderness. There is no rebound and no guarding. Musculoskeletal: She exhibits no edema. Lymphadenopathy:     She has no cervical adenopathy. Neurological: She is alert and oriented to person, place, and time. Skin: Skin is warm and dry. Psychiatric: She has a normal mood and affect. Her behavior is normal. Judgment and thought content normal.   Nursing note and vitals reviewed. ASSESSMENT and PLAN    ICD-10-CM ICD-9-CM    1. Annual physical exam Z00.00 V70.0 LIPID PANEL      THYROID CASCADE PROFILE      VITAMIN D, 1, 25 DIHYDROXY   2. Screen for colon cancer Z12.11 V76.51 REFERRAL TO GASTROENTEROLOGY   3. Vision changes H53.9 368.9 REFERRAL TO OPTOMETRY   4. Fibromyalgia M79.7 729.1 DULoxetine (CYMBALTA) 30 mg capsule   5. Depression with anxiety F41.8 300.4 DULoxetine (CYMBALTA) 30 mg capsule   6. Essential hypertension I10 401.9 Controlled   7. JENNIE on CPAP G47.33 327.23 Emphasized importance of compliance and risks of noncompliance including stroke/anxiety. Z99.89 V46.8    8.  Encounter for immunization Z23 V03.89 TETANUS, DIPHTHERIA TOXOIDS AND ACELLULAR PERTUSSIS VACCINE (TDAP), IN INDIVIDS. >=7, IM

## 2017-07-06 ENCOUNTER — TELEPHONE (OUTPATIENT)
Dept: INTERNAL MEDICINE CLINIC | Facility: CLINIC | Age: 52
End: 2017-07-06

## 2017-07-06 LAB
1,25(OH)2D3 SERPL-MCNC: 42.4 PG/ML (ref 19.9–79.3)
CHOLEST SERPL-MCNC: 203 MG/DL (ref 100–199)
HDLC SERPL-MCNC: 72 MG/DL
LDLC SERPL CALC-MCNC: 115 MG/DL (ref 0–99)
TRIGL SERPL-MCNC: 80 MG/DL (ref 0–149)
TSH SERPL DL<=0.005 MIU/L-ACNC: 1.72 UIU/ML (ref 0.45–4.5)
VLDLC SERPL CALC-MCNC: 16 MG/DL (ref 5–40)

## 2017-07-06 RX ORDER — DULOXETIN HYDROCHLORIDE 30 MG/1
30 CAPSULE, DELAYED RELEASE ORAL DAILY
Qty: 30 CAP | Refills: 0 | Status: SHIPPED | OUTPATIENT
Start: 2017-07-06 | End: 2017-07-24 | Stop reason: SDUPTHER

## 2017-07-06 NOTE — TELEPHONE ENCOUNTER
Called and spoke to patient and gave instructions as per LOUIS Vasquez in regards to Cymbalta #30 sent to pharmacy and for patient to take 1 tab po daily for one week and then 2 tabs po once daily until close to running out then to call office. Patient verbalized understanding and repeated the instructions.

## 2017-07-06 NOTE — TELEPHONE ENCOUNTER
Please inform pt that her insurance will only cover #30 of Cymbalta, not #60. Therefore, I sent in #30 with the directions: take 1 by mouth daily, but please ask her to take 1 by mouth once daily x 1 week, then increase to 2 by mouth once daily until she runs out. When she is close to running out, ask her to call and I will send in an Rx for #30 60 mg pills next.

## 2017-07-24 DIAGNOSIS — M79.7 FIBROMYALGIA: ICD-10-CM

## 2017-07-24 DIAGNOSIS — F41.8 DEPRESSION WITH ANXIETY: ICD-10-CM

## 2017-07-24 RX ORDER — DULOXETIN HYDROCHLORIDE 30 MG/1
30 CAPSULE, DELAYED RELEASE ORAL DAILY
Qty: 30 CAP | Refills: 5 | Status: SHIPPED | OUTPATIENT
Start: 2017-07-24 | End: 2017-07-27 | Stop reason: DRUGHIGH

## 2017-07-24 RX ORDER — FLUOCINONIDE 0.5 MG/G
CREAM TOPICAL
Qty: 30 G | Refills: 2 | Status: SHIPPED | OUTPATIENT
Start: 2017-07-24 | End: 2017-09-25 | Stop reason: SDUPTHER

## 2017-07-24 RX ORDER — AMLODIPINE AND BENAZEPRIL HYDROCHLORIDE 5; 20 MG/1; MG/1
1 CAPSULE ORAL DAILY
Qty: 30 CAP | Refills: 5 | Status: SHIPPED | OUTPATIENT
Start: 2017-07-24 | End: 2017-09-06 | Stop reason: ALTCHOICE

## 2017-07-24 NOTE — TELEPHONE ENCOUNTER
From: Maykel Flores  To: Thom Anthony PA-C  Sent: 7/24/2017 11:59 AM EDT  Subject: Medication Renewal Request    Original authorizing provider: Thom Anthony PA-C    Judyth Section Alveda Blocker would like a refill of the following medications:  amLODIPine-benazepril (LOTREL) 5-20 mg per capsule Thom Anthony PA-C]  DULoxetine (CYMBALTA) 30 mg capsule Thom Anthony PA-C]    Preferred pharmacy: [facility] [address] [facility]    Comment:   Good afternoon. I need a refill of the 60 mg Cymbalta, high blood pressure medication, and cream for eczema.  Thx!

## 2017-07-26 ENCOUNTER — TELEPHONE (OUTPATIENT)
Dept: INTERNAL MEDICINE CLINIC | Facility: CLINIC | Age: 52
End: 2017-07-26

## 2017-07-26 NOTE — TELEPHONE ENCOUNTER
Patient stated that Melissa Cartagena wanted to change the dosage of the Cymbalta to 60mg, however, she mistakenly sent the 30mg. Now, that she's out of her medication the correct dosage is needed. If the 30mg tablets were to be filled, then it wouldn't last a full 30 days. Please make the appropriate adjustments and contact patient when completed. Thanks!

## 2017-07-27 RX ORDER — DULOXETIN HYDROCHLORIDE 60 MG/1
60 CAPSULE, DELAYED RELEASE ORAL DAILY
Qty: 30 CAP | Refills: 5 | Status: SHIPPED | OUTPATIENT
Start: 2017-07-27 | End: 2017-08-14 | Stop reason: DRUGHIGH

## 2017-08-01 DIAGNOSIS — R05.9 COUGH: Primary | ICD-10-CM

## 2017-08-01 RX ORDER — BENZONATATE 200 MG/1
200 CAPSULE ORAL
Qty: 21 CAP | Refills: 0 | Status: SHIPPED | OUTPATIENT
Start: 2017-08-01 | End: 2017-08-08

## 2017-08-08 ENCOUNTER — OFFICE VISIT (OUTPATIENT)
Dept: INTERNAL MEDICINE CLINIC | Facility: CLINIC | Age: 52
End: 2017-08-08

## 2017-08-08 VITALS
TEMPERATURE: 97.4 F | SYSTOLIC BLOOD PRESSURE: 119 MMHG | DIASTOLIC BLOOD PRESSURE: 82 MMHG | RESPIRATION RATE: 18 BRPM | WEIGHT: 208 LBS | HEIGHT: 66 IN | OXYGEN SATURATION: 98 % | HEART RATE: 70 BPM | BODY MASS INDEX: 33.43 KG/M2

## 2017-08-08 DIAGNOSIS — T78.08XA: ICD-10-CM

## 2017-08-08 DIAGNOSIS — J45.41 ASTHMATIC BRONCHITIS, MODERATE PERSISTENT, WITH ACUTE EXACERBATION: Primary | ICD-10-CM

## 2017-08-08 RX ORDER — ALBUTEROL SULFATE 90 UG/1
1 AEROSOL, METERED RESPIRATORY (INHALATION)
Qty: 1 INHALER | Refills: 5 | Status: SHIPPED | OUTPATIENT
Start: 2017-08-08 | End: 2022-05-04

## 2017-08-08 RX ORDER — EPINEPHRINE 0.3 MG/.3ML
0.3 INJECTION SUBCUTANEOUS
Qty: 1 SYRINGE | Refills: 1 | Status: SHIPPED | OUTPATIENT
Start: 2017-08-08

## 2017-08-08 RX ORDER — METHYLPREDNISOLONE 4 MG/1
TABLET ORAL
Qty: 1 DOSE PACK | Refills: 0 | Status: SHIPPED | OUTPATIENT
Start: 2017-08-08 | End: 2017-08-21 | Stop reason: SDUPTHER

## 2017-08-08 NOTE — PATIENT INSTRUCTIONS
Methylprednisolone (By mouth)   Methylprednisolone (meth-il-pred-NIS-oh-lone)  Treats many diseases and conditions, including problems related to inflammation. This medicine is a corticosteroid. Brand Name(s): Medrol, Medrol Dosepak   There may be other brand names for this medicine. When This Medicine Should Not Be Used: This medicine is not right for everyone. Do not use it if you had an allergic reaction to methylprednisolone or if you have a fungus infection. How to Use This Medicine:   Tablet  · Take your medicine as directed. Your dose may need to be changed several times to find what works best for you. · If you are using this medicine for an ongoing illness, your dose may need to be changed occasionally. Some people take this medicine only every other day, which helps to decrease side effects. · Take your medicine in the morning, unless your doctor tells you otherwise. · It is best to take this medicine with food or milk. · Missed dose: Take a dose as soon as you remember. If it is almost time for your next dose, wait until then and take a regular dose. Do not take extra medicine to make up for a missed dose. · Store the medicine in a closed container at room temperature, away from heat, moisture, and direct light. Drugs and Foods to Avoid:   Ask your doctor or pharmacist before using any other medicine, including over-the-counter medicines, vitamins, and herbal products. · Some foods and medicines can affect how methylprednisolone works. Tell your doctor if you use any of the following:  ¨ Cyclosporine  ¨ Phenobarbital, phenytoin  ¨ Rifampin  ¨ Ketoconazole  ¨ Aspirin, especially high doses  ¨ Blood thinner, such as warfarin  ¨ Diabetes medicine  · This medicine may interfere with vaccines. Ask your doctor before you get a flu shot or any other vaccines.   Warnings While Using This Medicine:   · Tell your doctor if you are pregnant or breastfeeding, or if you have kidney problems, liver disease, heart failure, high blood pressure, osteoporosis, blood clotting problems, or thyroid problems. Also tell your doctor if you have had mental or emotional problems (such as depression) or stomach or bowel problems (such as an ulcer or diverticulitis). · This medicine may cause the following problems:  ¨ Mood or behavior changes  ¨ Higher blood pressure, retaining water, changes in salt or potassium levels  ¨ Cataracts or glaucoma (with long-term use)  ¨ Slow growth in children (with long-term use)  · Do not stop using this medicine suddenly. Your doctor will need to slowly decrease your dose before you stop it completely. · This medicine could cause you to get infections more easily. Tell your doctor right away if you have an infection or if you are exposed to chickenpox, measles, or other serious infection. Tell your doctor if you had a serious infection in the past, such as tuberculosis, herpes, or Strongyloides (threadworm). · Tell your doctor about any extra stress or anxiety in your life. Your dose might need to be changed for a short time. · Tell any doctor or dentist who treats you that you are using this medicine. · Keep all medicine out of the reach of children. Never share your medicine with anyone.   Possible Side Effects While Using This Medicine:   Call your doctor right away if you notice any of these side effects:  · Allergic reaction: Itching or hives, swelling in your face or hands, swelling or tingling in your mouth or throat, chest tightness, trouble breathing  · Dark freckles, skin color changes, coldness, weakness, tiredness, nausea, vomiting, weight loss  · Depression, unusual thoughts, feelings, or behaviors, trouble sleeping  · Fever, chills, cough, sore throat, and body aches  · Severe stomach pain, nausea, vomiting, or red or black stools  · Skin changes or growths  · Swelling in your hands, ankles, or feet, rapid weight gain  · Trouble seeing, eye pain, headache  If you notice these less serious side effects, talk with your doctor:   · Increased appetite  · Round, puffy face  · Weight gain around your neck, upper back, breast, face, or waist  If you notice other side effects that you think are caused by this medicine, tell your doctor. Call your doctor for medical advice about side effects. You may report side effects to FDA at 1-712-HYW-5002  © 2017 [facility] Information is for End User's use only and may not be sold, redistributed or otherwise used for commercial purposes. The above information is an  only. It is not intended as medical advice for individual conditions or treatments. Talk to your doctor, nurse or pharmacist before following any medical regimen to see if it is safe and effective for you. Bronchitis: Care Instructions  Your Care Instructions    Bronchitis is inflammation of the bronchial tubes, which carry air to the lungs. The tubes swell and produce mucus, or phlegm. The mucus and inflamed bronchial tubes make you cough. You may have trouble breathing. Most cases of bronchitis are caused by viruses like those that cause colds. Antibiotics usually do not help and they may be harmful. Bronchitis usually develops rapidly and lasts about 2 to 3 weeks in otherwise healthy people. Follow-up care is a key part of your treatment and safety. Be sure to make and go to all appointments, and call your doctor if you are having problems. It's also a good idea to know your test results and keep a list of the medicines you take. How can you care for yourself at home? · Take all medicines exactly as prescribed. Call your doctor if you think you are having a problem with your medicine. · Get some extra rest.  · Take an over-the-counter pain medicine, such as acetaminophen (Tylenol), ibuprofen (Advil, Motrin), or naproxen (Aleve) to reduce fever and relieve body aches. Read and follow all instructions on the label.   · Do not take two or more pain medicines at the same time unless the doctor told you to. Many pain medicines have acetaminophen, which is Tylenol. Too much acetaminophen (Tylenol) can be harmful. · Take an over-the-counter cough medicine that contains dextromethorphan to help quiet a dry, hacking cough so that you can sleep. Avoid cough medicines that have more than one active ingredient. Read and follow all instructions on the label. · Breathe moist air from a humidifier, hot shower, or sink filled with hot water. The heat and moisture will thin mucus so you can cough it out. · Do not smoke. Smoking can make bronchitis worse. If you need help quitting, talk to your doctor about stop-smoking programs and medicines. These can increase your chances of quitting for good. When should you call for help? Call 911 anytime you think you may need emergency care. For example, call if:  · You have severe trouble breathing. Call your doctor now or seek immediate medical care if:  · You have new or worse trouble breathing. · You cough up dark brown or bloody mucus (sputum). · You have a new or higher fever. · You have a new rash. Watch closely for changes in your health, and be sure to contact your doctor if:  · You cough more deeply or more often, especially if you notice more mucus or a change in the color of your mucus. · You are not getting better as expected. Where can you learn more? Go to http://sunshine-kandi.info/. Enter H333 in the search box to learn more about \"Bronchitis: Care Instructions. \"  Current as of: March 25, 2017  Content Version: 11.3  © 6070-4962 [employer]. Care instructions adapted under license by [employer] (which disclaims liability or warranty for this information). If you have questions about a medical condition or this instruction, always ask your healthcare professional. [address] any warranty or liability for your use of this information.

## 2017-08-08 NOTE — PROGRESS NOTES
Chief Complaint   Patient presents with    Cough     Pt stated really back post nasal drip    Medication Refill     epipen, inhaler     1. Have you been to the ER, urgent care clinic since your last visit? Hospitalized since your last visit? No    2. Have you seen or consulted any other health care providers outside of the [address] [place] [address] since your last visit? Include any pap smears or colon screening.  No

## 2017-08-08 NOTE — MR AVS SNAPSHOT
Visit Information Date & Time Provider Department Dept. Phone Encounter #  
 8/8/2017  8:15 AM Alvino , [address] Internal Medicine [phone] [id number] Follow-up Instructions Return in about 2 days (around 8/10/2017) for Call by Thursday if there is no improvement. Your Appointments 8/16/2017  8:00 AM  
ROUTINE CARE with LATHA [place] Internal Medicine ([facility]) Appt Note: 6 week follow up $0CP 07/05/2017 [place]  
 Orville Huertadaniela Upcoming Health Maintenance Date Due Pneumococcal 19-64 Medium Risk (1 of 1 - PPSV23) 9/16/1985 FOBT Q 1 YEAR AGE 50-75 9/16/2016 INFLUENZA AGE 9 TO ADULT 8/1/2017 BREAST CANCER SCRN MAMMOGRAM 3/3/2019 PAP AKA CERVICAL CYTOLOGY 3/1/2020 DTaP/Tdap/Td series (2 - Td) 7/5/2027 Allergies as of 8/8/2017  Review Complete On: 8/8/2017 By: Alvino , NP Severity Noted Reaction Type Reactions Egg High 01/28/2011    Shortness of Breath, Nausea and Vomiting  
 Sulfa (Sulfonamide Antibiotics) High 11/12/2010    Anaphylaxis, Shortness of Breath, Swelling Broccoli  01/28/2011    Rash, Itching Chicken Derived  02/11/2013    Nausea and Vomiting Dairy Aid [Lactase]  01/28/2011    Swelling Doxycycline  11/12/2010    Unknown (comments) Gluten  01/28/2011    Swelling Pravastatin  09/04/2012    Myalgia, Other (comments) Blurry vision Tetracycline  11/12/2010    Unknown (comments) Wheat  01/28/2011    Nausea and Vomiting Current Immunizations  Reviewed on 7/5/2017 Name Date Influenza Vaccine PF  Deferred (Patient Refused) Pneumococcal Conjugate (PCV-13)  Deferred (Patient Refused) Tdap 7/5/2017 Not reviewed this visit You Were Diagnosed With   
  
 Codes Comments Asthmatic bronchitis, moderate persistent, with acute exacerbation    -  Primary ICD-10-CM: J45.41 
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  
 33.57 kg/m 2 2.1 m 2 Preferred Pharmacy Pharmacy Name Phone Dominique Ol[facility] [address] [place] [address], [address] [place] [address] [phone] Your Updated Medication List  
  
   
This list is accurate as of: 8/8/17  8:47 AM.  Always use your most recent med list.  
  
  
  
  
 albuterol 90 mcg/actuation inhaler Commonly known as:  PROVENTIL HFA, VENTOLIN HFA, PROAIR HFA Take 1 Puff by inhalation every six (6) hours as needed for Wheezing. amLODIPine-benazepril 5-20 mg per capsule Commonly known as:  Doni Pride Take 1 Cap by mouth daily. cetirizine 10 mg tablet Commonly known as:  ZYRTEC Take 1 Tab by mouth nightly. DULoxetine 60 mg capsule Commonly known as:  CYMBALTA Take 1 Cap by mouth daily. EPINEPHrine 0.3 mg/0.3 mL injection Commonly known as:  EPIPEN  
0.3 mL by IntraMUSCular route once as needed for up to 1 dose. fluocinoNIDE 0.05 % topical cream  
Commonly known as:  LIDEX Apply  to affected area two (2) times daily as needed (eczema). ibuprofen 800 mg tablet Commonly known as:  MOTRIN Take 1 Tab by mouth every six (6) hours as needed for Pain (take with food). methylPREDNISolone 4 mg tablet Commonly known as:  Vernette Zachary Follow pack instructions  
  
 traMADol 50 mg tablet Commonly known as:  Climmie Splinter  
 Take 1 Tab by mouth every six (6) hours as needed for Pain. Max Daily Amount: 200 mg. Prescriptions Sent to Pharmacy Refills  
 methylPREDNISolone (MEDROL DOSEPACK) 4 mg tablet 0 Sig: Follow pack instructions Class: Normal  
 Pharmacy: [address] Ph #: [phone] EPINEPHrine (EPIPEN) 0.3 mg/0.3 mL injection 1 Si.3 mL by IntraMUSCular route once as needed for up to 1 dose. Class: Normal  
 Pharmacy: [address] Ph #: [phone] Route: IntraMUSCular  
 albuterol (PROVENTIL HFA, VENTOLIN HFA, PROAIR HFA) 90 mcg/actuation inhaler 5 Sig: Take 1 Puff by inhalation every six (6) hours as needed for Wheezing. Class: Normal  
 Pharmacy: [address] Ph #: [phone] Route: Inhalation Follow-up Instructions Return in about 2 days (around 8/10/2017) for Call by Thursday if there is no improvement. Patient Instructions Methylprednisolone (By mouth) Methylprednisolone (meth-il-pred-NIS-oh-lone) Treats many diseases and conditions, including problems related to inflammation. This medicine is a corticosteroid. Brand Name(s): Medrol, Medrol Dosepak There may be other brand names for this medicine. When This Medicine Should Not Be Used: This medicine is not right for everyone. Do not use it if you had an allergic reaction to methylprednisolone or if you have a fungus infection. How to Use This Medicine:  
Tablet · Take your medicine as directed. Your dose may need to be changed several times to find what works best for you. · If you are using this medicine for an ongoing illness, your dose may need to be changed occasionally. Some people take this medicine only every other day, which helps to decrease side effects. · Take your medicine in the morning, unless your doctor tells you otherwise. · It is best to take this medicine with food or milk. · Missed dose: Take a dose as soon as you remember. If it is almost time for your next dose, wait until then and take a regular dose. Do not take extra medicine to make up for a missed dose. · Store the medicine in a closed container at room temperature, away from heat, moisture, and direct light. Drugs and Foods to Avoid: Ask your doctor or pharmacist before using any other medicine, including over-the-counter medicines, vitamins, and herbal products. · Some foods and medicines can affect how methylprednisolone works. Tell your doctor if you use any of the following: ¨ Cyclosporine ¨ Phenobarbital, phenytoin ¨ Rifampin ¨ Ketoconazole ¨ Aspirin, especially high doses ¨ Blood thinner, such as warfarin ¨ Diabetes medicine · This medicine may interfere with vaccines. Ask your doctor before you get a flu shot or any other vaccines. Warnings While Using This Medicine: · Tell your doctor if you are pregnant or breastfeeding, or if you have kidney problems, liver disease, heart failure, high blood pressure, osteoporosis, blood clotting problems, or thyroid problems. Also tell your doctor if you have had mental or emotional problems (such as depression) or stomach or bowel problems (such as an ulcer or diverticulitis). · This medicine may cause the following problems: ¨ Mood or behavior changes ¨ Higher blood pressure, retaining water, changes in salt or potassium levels ¨ Cataracts or glaucoma (with long-term use) ¨ Slow growth in children (with long-term use) · Do not stop using this medicine suddenly. Your doctor will need to slowly decrease your dose before you stop it completely. · This medicine could cause you to get infections more easily. Tell your doctor right away if you have an infection or if you are exposed to chickenpox, measles, or other serious infection.  Tell your doctor if you had a serious infection in the past, such as tuberculosis, herpes, or Strongyloides (threadworm). · Tell your doctor about any extra stress or anxiety in your life. Your dose might need to be changed for a short time. · Tell any doctor or dentist who treats you that you are using this medicine. · Keep all medicine out of the reach of children. Never share your medicine with anyone. Possible Side Effects While Using This Medicine:  
Call your doctor right away if you notice any of these side effects: · Allergic reaction: Itching or hives, swelling in your face or hands, swelling or tingling in your mouth or throat, chest tightness, trouble breathing · Dark freckles, skin color changes, coldness, weakness, tiredness, nausea, vomiting, weight loss · Depression, unusual thoughts, feelings, or behaviors, trouble sleeping · Fever, chills, cough, sore throat, and body aches · Severe stomach pain, nausea, vomiting, or red or black stools · Skin changes or growths · Swelling in your hands, ankles, or feet, rapid weight gain · Trouble seeing, eye pain, headache If you notice these less serious side effects, talk with your doctor: · Increased appetite · Round, puffy face · Weight gain around your neck, upper back, breast, face, or waist 
If you notice other side effects that you think are caused by this medicine, tell your doctor. Call your doctor for medical advice about side effects. You may report side effects to FDA at 7-295-FDA-5934 © 2017 [address] Information is for End User's use only and may not be sold, redistributed or otherwise used for commercial purposes. The above information is an  only. It is not intended as medical advice for individual conditions or treatments. Talk to your doctor, nurse or pharmacist before following any medical regimen to see if it is safe and effective for you. Bronchitis: Care Instructions Your Care Instructions Bronchitis is inflammation of the bronchial tubes, which carry air to the lungs. The tubes swell and produce mucus, or phlegm. The mucus and inflamed bronchial tubes make you cough. You may have trouble breathing. Most cases of bronchitis are caused by viruses like those that cause colds. Antibiotics usually do not help and they may be harmful. Bronchitis usually develops rapidly and lasts about 2 to 3 weeks in otherwise healthy people. Follow-up care is a key part of your treatment and safety. Be sure to make and go to all appointments, and call your doctor if you are having problems. It's also a good idea to know your test results and keep a list of the medicines you take. How can you care for yourself at home? · Take all medicines exactly as prescribed. Call your doctor if you think you are having a problem with your medicine. · Get some extra rest. 
· Take an over-the-counter pain medicine, such as acetaminophen (Tylenol), ibuprofen (Advil, Motrin), or naproxen (Aleve) to reduce fever and relieve body aches. Read and follow all instructions on the label. · Do not take two or more pain medicines at the same time unless the doctor told you to. Many pain medicines have acetaminophen, which is Tylenol. Too much acetaminophen (Tylenol) can be harmful. · Take an over-the-counter cough medicine that contains dextromethorphan to help quiet a dry, hacking cough so that you can sleep. Avoid cough medicines that have more than one active ingredient. Read and follow all instructions on the label. · Breathe moist air from a humidifier, hot shower, or sink filled with hot water. The heat and moisture will thin mucus so you can cough it out. · Do not smoke. Smoking can make bronchitis worse. If you need help quitting, talk to your doctor about stop-smoking programs and medicines. These can increase your chances of quitting for good. When should you call for help? Call 911 anytime you think you may need emergency care. For example, call if: 
· You have severe trouble breathing. Call your doctor now or seek immediate medical care if: 
· You have new or worse trouble breathing. · You cough up dark brown or bloody mucus (sputum). · You have a new or higher fever. · You have a new rash. Watch closely for changes in your health, and be sure to contact your doctor if: 
· You cough more deeply or more often, especially if you notice more mucus or a change in the color of your mucus. · You are not getting better as expected. Where can you learn more? Go to http://sunshine-kandi.info/. Enter H333 in the search box to learn more about \"Bronchitis: Care Instructions. \" Current as of: March 25, 2017 Content Version: 11.3 © 7889-4639 [employer]. Care instructions adapted under license by [employer] (which disclaims liability or warranty for this information). If you have questions about a medical condition or this instruction, always ask your healthcare professional. [place] [address] any warranty or liability for your use of this information. Introducing [facility]! Dear Avelina Hearing: Thank you for requesting a [employer] account. Our records indicate that you already have an active [employer] account. You can access your account anytime at [email or web address] Did you know that you can access your hospital and ER discharge instructions at any time in [employer]? You can also review all of your test results from your hospital stay or ER visit. Additional Information If you have questions, please visit the Frequently Asked Questions section of the [employer] website at [email or web address] Remember, [employer] is NOT to be used for urgent needs. For medical emergencies, dial 911. Now available from your iPhone and Android! Please provide this summary of care documentation to your next provider. Your primary care clinician is listed as Tiffany Carrizales. If you have any questions after today's visit, please call [phone].

## 2017-08-08 NOTE — PROGRESS NOTES
Subjective:      Maykel Flores is a 49 y.o. female who presents today for acute issues and medication refills. Post nasal drip: She has been treated for an allergic cough for most of the summer. She states the cough has gotten worse in the past week, she states it is all day and night. She states she feels like symptoms are worsening quickly. She currently post nasal drip, dry cough, left ear pain. She denies fevers, nausea. 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 She has taken tesslon perles, Cepacol, mucinex, flonase robitussin,  cough syrup. Patient Active Problem List    Diagnosis Date Noted    Chronic bilateral low back pain with left-sided sciatica 2017    Essential hypertension 2017    Allergy with anaphylaxis due to eggs 2017    Exercise-induced asthma 2017    Fibromyalgia 2016    Elevated BP 2016    History of hysterectomy for benign disease 2016    Hyperlipidemia 2016    History of lumbar laminectomy 2016    JENNIE on CPAP 2016    Depression with anxiety 2016    Obesity 10/07/2012    Precordial pain 10/07/2012    Thyroid nodule 2011     Current Outpatient Prescriptions   Medication Sig Dispense Refill    DULoxetine (CYMBALTA) 60 mg capsule Take 1 Cap by mouth daily. 30 Cap 5    amLODIPine-benazepril (LOTREL) 5-20 mg per capsule Take 1 Cap by mouth daily. 30 Cap 5    fluocinoNIDE (LIDEX) 0.05 % topical cream Apply  to affected area two (2) times daily as needed (eczema). 30 g 2    cetirizine (ZYRTEC) 10 mg tablet Take 1 Tab by mouth nightly. 30 Tab 11    EPINEPHrine (EPIPEN) 0.3 mg/0.3 mL injection 0.3 mL by IntraMUSCular route once as needed for up to 1 dose. 1 Syringe 1    ibuprofen (MOTRIN) 800 mg tablet Take 1 Tab by mouth every six (6) hours as needed for Pain (take with food).  60 Tab 5    traMADol (ULTRAM) 50 mg tablet Take 1 Tab by mouth every six (6) hours as needed for Pain. Max Daily Amount: 200 mg. 60 Tab 1     Allergies   Allergen Reactions    Egg Shortness of Breath and Nausea and Vomiting    Sulfa (Sulfonamide Antibiotics) Anaphylaxis, Shortness of Breath and Swelling    Broccoli Rash and Itching    Chicken Derived Nausea and Vomiting    Dairy Aid [Lactase] Swelling    Doxycycline Unknown (comments)    Gluten Swelling    Pravastatin Myalgia and Other (comments)     Blurry vision    Tetracycline Unknown (comments)    Wheat Nausea and Vomiting     Past Medical History:   Diagnosis Date    Abdominal abscess ([address]     Contact dermatitis and other eczema, due to unspecified cause     Depression     Dyslipidemia     Headache     History of mammography, screening 12/2013    Normal     Hypertension     Lumbar stenosis july 2012    Pleurisy     Psychiatric disorder     DEPRESSION.  Scoliosis      Family History   Problem Relation Age of Onset   [address] [facility] Diabetes Mother     Thyroid Disease Mother     Thyroid Disease Father     Hypertension Father     Asthma Sister     MS Sister     Thyroid Disease Sister      Social History   Substance Use Topics    Smoking status: Former Smoker     Quit date: 9/12/2004    Smokeless tobacco: Never Used    Alcohol use 0.0 oz/week      Comment: 1 or 2 glass of wine a month         Review of Systems    A comprehensive review of systems was negative except for that written in the HPI. Objective:     Visit Vitals    /82    Pulse 70    Temp 97.4 °F (36.3 °C) (Oral)    Resp 18    Ht 5' 6\" (1.676 m)    Wt 208 lb (94.3 kg)    LMP 11/03/2010    SpO2 98%    BMI 33.57 kg/m2     General appearance: alert, cooperative, no distress, appears stated age  Head: Normocephalic, without obvious abnormality, atraumatic  Eyes: negative  Ears: abnormal TM AD - serous middle ear fluid, abnormal TM AS - serous middle ear fluid  Nose: Nares normal. Septum midline. Mucosa normal. No drainage or sinus tenderness.   Throat: Lips, mucosa, and tongue normal. Teeth and gums normal  Neck: supple, symmetrical, trachea midline and no adenopathy  Back: symmetric, no curvature. ROM normal. No CVA tenderness. Lungs: inspiratory wheeze noted to all lung fields  Heart: regular rate and rhythm, S1, S2 normal, no murmur, click, rub or gallop  Extremities: extremities normal, atraumatic, no cyanosis or edema  Pulses: 2+ and symmetric  Neurologic: Alert and oriented X 3, normal strength and tone. Normal symmetric reflexes. Normal coordination and gait  Nursing note and vitals reviewed  Assessment/Plan:       ICD-10-CM ICD-9-CM    1. Asthmatic bronchitis, moderate persistent, with acute exacerbation J45.41 493.92 methylPREDNISolone (MEDROL DOSEPACK) 4 mg tablet      albuterol (PROVENTIL HFA, VENTOLIN HFA, PROAIR HFA) 90 mcg/actuation inhaler   2. Allergy with anaphylaxis due to eggs, initial encounter T78. 08XA 995.68 EPINEPHrine (EPIPEN) 0.3 mg/0.3 mL injection     Follow-up Disposition:  Return in about 2 days (around 8/10/2017) for Call by Thursday if there is no improvement. Advised her to call back or return to office if symptoms worsen/change/persist.  Discussed expected course/resolution/complications of diagnosis in detail with patient. Medication risks/benefits/costs/interactions/alternatives discussed with patient. She was given an after visit summary which includes diagnoses, current medications, & vitals. She expressed understanding with the diagnosis and plan.

## 2017-08-14 ENCOUNTER — PATIENT MESSAGE (OUTPATIENT)
Dept: INTERNAL MEDICINE CLINIC | Facility: CLINIC | Age: 52
End: 2017-08-14

## 2017-08-14 RX ORDER — DULOXETIN HYDROCHLORIDE 30 MG/1
90 CAPSULE, DELAYED RELEASE ORAL DAILY
Qty: 90 CAP | Refills: 5 | Status: SHIPPED | OUTPATIENT
Start: 2017-08-14 | End: 2017-08-29 | Stop reason: DRUGHIGH

## 2017-08-14 NOTE — TELEPHONE ENCOUNTER
From: Oliva Benitez  To: Dianna Stern PA-C  Sent: 8/14/2017 10:07 AM EDT  Subject: Non-Urgent Medical Question     Good morning! Just wanted to let you know that I am no longer coughing and I am feeling very good. Still have postnasal drip but continuing to take the Zyrtec every night. I really like the Cymbalta. It's not doing anything for pain, but it has really helped with my depression. No more crying outbreaks. I'd like to keep taking it. However my anxiety is like 100 and I walk around nervous.  Still haven't found anything to help with that

## 2017-08-21 ENCOUNTER — OFFICE VISIT (OUTPATIENT)
Dept: INTERNAL MEDICINE CLINIC | Facility: CLINIC | Age: 52
End: 2017-08-21

## 2017-08-21 VITALS
BODY MASS INDEX: 33.91 KG/M2 | RESPIRATION RATE: 18 BRPM | TEMPERATURE: 99 F | HEIGHT: 66 IN | HEART RATE: 81 BPM | SYSTOLIC BLOOD PRESSURE: 124 MMHG | OXYGEN SATURATION: 97 % | DIASTOLIC BLOOD PRESSURE: 89 MMHG | WEIGHT: 211 LBS

## 2017-08-21 DIAGNOSIS — J45.41 ASTHMATIC BRONCHITIS, MODERATE PERSISTENT, WITH ACUTE EXACERBATION: ICD-10-CM

## 2017-08-21 RX ORDER — BENZONATATE 200 MG/1
200 CAPSULE ORAL
Qty: 21 CAP | Refills: 0 | Status: SHIPPED | OUTPATIENT
Start: 2017-08-21 | End: 2017-08-28

## 2017-08-21 RX ORDER — METHYLPREDNISOLONE 4 MG/1
TABLET ORAL
Qty: 1 DOSE PACK | Refills: 0 | Status: SHIPPED | OUTPATIENT
Start: 2017-08-21 | End: 2017-08-29

## 2017-08-21 NOTE — PROGRESS NOTES
HISTORY OF PRESENT ILLNESS  Yannick Tompkins is a 49 y.o. female. Chief Complaint   Patient presents with    Cough     Patient states her chest is burning from coughing     HPI  1) Finished antibiotic and felt somewhat better, but cough persists. PND persists, headache, cough is somewhat productive now. Mild SOB. Not needing albuterol regularly. Review of Systems   Constitutional: Positive for malaise/fatigue. Negative for fever. HENT: Positive for congestion. Negative for ear pain. Respiratory: Positive for cough and sputum production. Negative for shortness of breath. Neurological: Positive for headaches. Endo/Heme/Allergies: Negative for environmental allergies. Physical Exam   Constitutional: She is oriented to person, place, and time. She appears well-developed and well-nourished. No distress. HENT:   Head: Normocephalic and atraumatic. Mouth/Throat: Oropharynx is clear and moist.   Bilateral TMs with fluid. No erythema. Nasal mucosa pale, inflamed. Eyes: Conjunctivae are normal.   Neck: Neck supple. Cardiovascular: Normal rate, regular rhythm and normal heart sounds. Pulmonary/Chest: Effort normal and breath sounds normal.   Lymphadenopathy:     She has no cervical adenopathy. Neurological: She is alert and oriented to person, place, and time. Skin: Skin is warm and dry. Psychiatric: She has a normal mood and affect. Her behavior is normal. Judgment and thought content normal.   Nursing note and vitals reviewed. ASSESSMENT and PLAN    ICD-10-CM ICD-9-CM    1.  Asthmatic bronchitis, moderate persistent, with acute exacerbation J45.41 493.92 methylPREDNISolone (MEDROL DOSEPACK) 4 mg tablet      benzonatate (TESSALON) 200 mg capsule

## 2017-08-21 NOTE — PROGRESS NOTES
Room 7    Chief Complaint   Patient presents with    Cough     Patient states her chest is burning from coughing     1. Have you been to the ER, urgent care clinic since your last visit? Hospitalized since your last visit? No    2. Have you seen or consulted any other health care providers outside of the [address] [place] [address] since your last visit? Include any pap smears or colon screening.  No     Health Maintenance Due   Topic Date Due    Pneumococcal 19-64 Medium Risk (1 of 1 - PPSV23) 09/16/1985    FOBT Q 1 YEAR AGE 50-75  09/16/2016    INFLUENZA AGE 9 TO ADULT  08/01/2017

## 2017-08-28 ENCOUNTER — TELEPHONE (OUTPATIENT)
Dept: INTERNAL MEDICINE CLINIC | Facility: CLINIC | Age: 52
End: 2017-08-28

## 2017-08-28 NOTE — TELEPHONE ENCOUNTER
Called pt. She is coughing and sounds fatigued. Advised her that she needs to be seen either here or at a Walk In clinic, and gave her the phone number for [facility]. Instructed her to call us with info for who and when she is being seen by Pulm so we can do prior auth.

## 2017-08-28 NOTE — TELEPHONE ENCOUNTER
Patient came in for an appointment on 8/21 and patient states that her symptoms are not getting better. Patient states that she is still having the burning in her chest. Patient states that she is not able to come in for an appointment due to not having any transportation. Patient would like to know if there is anything physician assistant would be able to do for her.

## 2017-08-29 ENCOUNTER — PATIENT MESSAGE (OUTPATIENT)
Dept: INTERNAL MEDICINE CLINIC | Facility: CLINIC | Age: 52
End: 2017-08-29

## 2017-08-29 ENCOUNTER — OFFICE VISIT (OUTPATIENT)
Dept: INTERNAL MEDICINE CLINIC | Facility: CLINIC | Age: 52
End: 2017-08-29

## 2017-08-29 VITALS
BODY MASS INDEX: 34.06 KG/M2 | OXYGEN SATURATION: 97 % | WEIGHT: 211.9 LBS | RESPIRATION RATE: 20 BRPM | SYSTOLIC BLOOD PRESSURE: 127 MMHG | HEART RATE: 94 BPM | TEMPERATURE: 98.2 F | DIASTOLIC BLOOD PRESSURE: 87 MMHG | HEIGHT: 66 IN

## 2017-08-29 DIAGNOSIS — R05.9 COUGH: Primary | ICD-10-CM

## 2017-08-29 DIAGNOSIS — J40 BRONCHITIS: ICD-10-CM

## 2017-08-29 DIAGNOSIS — F41.8 DEPRESSION WITH ANXIETY: ICD-10-CM

## 2017-08-29 DIAGNOSIS — M79.7 FIBROMYALGIA: ICD-10-CM

## 2017-08-29 RX ORDER — AZITHROMYCIN 250 MG/1
TABLET, FILM COATED ORAL
Qty: 6 TAB | Refills: 0 | Status: SHIPPED | OUTPATIENT
Start: 2017-08-29 | End: 2017-09-03

## 2017-08-29 RX ORDER — BUDESONIDE AND FORMOTEROL FUMARATE DIHYDRATE 160; 4.5 UG/1; UG/1
2 AEROSOL RESPIRATORY (INHALATION) 2 TIMES DAILY
Qty: 1 INHALER | Refills: 2 | Status: SHIPPED | OUTPATIENT
Start: 2017-08-29 | End: 2017-08-30 | Stop reason: ALTCHOICE

## 2017-08-29 RX ORDER — DULOXETIN HYDROCHLORIDE 60 MG/1
60 CAPSULE, DELAYED RELEASE ORAL DAILY
Qty: 30 CAP | Refills: 5 | Status: SHIPPED | OUTPATIENT
Start: 2017-08-29 | End: 2017-10-24 | Stop reason: ALTCHOICE

## 2017-08-29 RX ORDER — OXYCODONE AND ACETAMINOPHEN 5; 325 MG/1; MG/1
1 TABLET ORAL
Qty: 10 TAB | Refills: 0 | Status: SHIPPED | OUTPATIENT
Start: 2017-08-29 | End: 2017-10-16

## 2017-08-29 NOTE — PROGRESS NOTES
HISTORY OF PRESENT ILLNESS  Carmen Sierra is a 49 y.o. female. HPI  1) Cough started at night in late June. Has continued worsening ever since. Now coughing all day every day. Ribs ache from cough. Pt notes she is sleeping downstairs because keeping the entire house awake at night. Had TDAP vaccine in late July. Home  and children have also been coughing. Seen on 8/8/17: Medrol Dose Pack  8/9/17: no improvement. Sent in Levaquin  Pt was last sen 8/21/17 - had finished Levaquin and noted that her chest was \"burning\" from coughing. Was feeling mildly SOB, but not needing albuterol regularly. Gave pt a second Medrol Dose Pack x 6 days. Finished Medrol Dose pack, and now cough persists. Has appointment with Pulm 9/14. 2) Fibromyalgia/Depression - doing well on 90 mg Cymbalta, but pt notes that her insurance will not cover it even if we try prior auth. Would like to go back to 60 mg. Review of Systems   Constitutional: Positive for malaise/fatigue. Negative for fever. Respiratory: Positive for cough and shortness of breath. Negative for sputum production. Physical Exam   Constitutional: She is oriented to person, place, and time. She appears well-developed and well-nourished. No distress. Fatigued. HENT:   Head: Normocephalic and atraumatic. Neck: Neck supple. No JVD present. Cardiovascular: Normal rate, regular rhythm and normal heart sounds. Pulmonary/Chest: Effort normal and breath sounds normal. No respiratory distress. Deep, frequent cough in office today. Musculoskeletal: She exhibits no edema. Neurological: She is alert and oriented to person, place, and time. Skin: Skin is warm and dry. Psychiatric: She has a normal mood and affect. Her behavior is normal. Judgment and thought content normal.   Nursing note and vitals reviewed. ASSESSMENT and PLAN    ICD-10-CM ICD-9-CM    1. Cough R05 786.2 I question if pt possibly has Pertussis. oxyCODONE-acetaminophen (PERCOCET) 5-325 mg per tablet      azithromycin (ZITHROMAX) 250 mg tablet      budesonide-formoterol (SYMBICORT) 160-4.5 mcg/actuation HFA inhaler    Follow up with [facility] mid September. Pt has x-ray order from [address] [place] [address]. 2. Bronchitis J40 490 oxyCODONE-acetaminophen (PERCOCET) 5-325 mg per tablet      budesonide-formoterol (SYMBICORT) 160-4.5 mcg/actuation HFA inhaler   3. Fibromyalgia M79.7 729.1 Change dose to: DULoxetine (CYMBALTA) 60 mg capsule   4.  Depression with anxiety F41.8 300.4 DULoxetine (CYMBALTA) 60 mg capsule

## 2017-08-29 NOTE — PROGRESS NOTES
Room 7    Chief Complaint   Patient presents with    Cough     patient states she has been coughing for a couple of months     1. Have you been to the ER, urgent care clinic since your last visit? Hospitalized since your last visit? No    2. Have you seen or consulted any other health care providers outside of the [address] [place] [address] since your last visit? Include any pap smears or colon screening.  No     Health Maintenance Due   Topic Date Due    Pneumococcal 19-64 Medium Risk (1 of 1 - PPSV23) 09/16/1985    FOBT Q 1 YEAR AGE 50-75  09/16/2016    INFLUENZA AGE 9 TO ADULT  08/01/2017

## 2017-08-30 NOTE — TELEPHONE ENCOUNTER
From: Ganesh Mcgee  To: Jorje Burkitt, PA-C  Sent: 8/29/2017 10:05 PM EDT  Subject: Prescription Question    Hello again. The insur won't cover the inhaler you chose, but James Marlow is on the list. Will that work?

## 2017-09-05 ENCOUNTER — HOSPITAL ENCOUNTER (OUTPATIENT)
Dept: GENERAL RADIOLOGY | Age: 52
Discharge: HOME | End: 2017-09-05
Payer: COMMERCIAL

## 2017-09-05 DIAGNOSIS — R05.9 COUGH: ICD-10-CM

## 2017-09-05 PROCEDURE — 71020 XR CHEST PA LAT: CPT

## 2017-09-06 ENCOUNTER — OFFICE VISIT (OUTPATIENT)
Dept: INTERNAL MEDICINE CLINIC | Facility: CLINIC | Age: 52
End: 2017-09-06

## 2017-09-06 VITALS
HEIGHT: 66 IN | HEART RATE: 93 BPM | RESPIRATION RATE: 18 BRPM | BODY MASS INDEX: 35.03 KG/M2 | DIASTOLIC BLOOD PRESSURE: 97 MMHG | OXYGEN SATURATION: 98 % | SYSTOLIC BLOOD PRESSURE: 134 MMHG | WEIGHT: 218 LBS | TEMPERATURE: 97 F

## 2017-09-06 DIAGNOSIS — R11.10 POST-TUSSIVE EMESIS: ICD-10-CM

## 2017-09-06 DIAGNOSIS — I10 ESSENTIAL HYPERTENSION: ICD-10-CM

## 2017-09-06 DIAGNOSIS — R19.7 DIARRHEA, UNSPECIFIED TYPE: ICD-10-CM

## 2017-09-06 DIAGNOSIS — R05.3 CHRONIC COUGH: Primary | ICD-10-CM

## 2017-09-06 RX ORDER — AMLODIPINE BESYLATE 5 MG/1
5 TABLET ORAL DAILY
Qty: 30 TAB | Refills: 0 | Status: SHIPPED | OUTPATIENT
Start: 2017-09-06 | End: 2017-09-25 | Stop reason: DRUGHIGH

## 2017-09-06 RX ORDER — HYDROCHLOROTHIAZIDE 25 MG/1
25 TABLET ORAL DAILY
Qty: 30 TAB | Refills: 0 | Status: SHIPPED | OUTPATIENT
Start: 2017-09-06 | End: 2017-09-25 | Stop reason: SDUPTHER

## 2017-09-06 RX ORDER — CODEINE PHOSPHATE AND GUAIFENESIN 10; 100 MG/5ML; MG/5ML
5 SOLUTION ORAL
Qty: 118 ML | Refills: 0 | Status: SHIPPED | OUTPATIENT
Start: 2017-09-06 | End: 2017-10-12

## 2017-09-06 NOTE — PROGRESS NOTES
Subjective:      Jayson Hampton is a 49 y.o. female who presents today for acute visit. Cough: She states she is coughing, vomiting, and having diarrhea. Her cough is described as dry, left ear pain also and is rated 1-2/10, SOB even without coughing. This has been ongoing for months, she has seen LOUIS Almodovar for this and it was suspected that is was allergies. She has an appointment with pulmonology on the 14th of September. Her cough is causing her to vomit, this is 2-3 times daily. Diarrhea started a few days ago. She is having chills and sweats but denies fever. She is taking mucinex, flonase, OTC cough suppressant, she has been using pedialyte. She believes cough may have started around the time that she was put on lotrel (which has lisinopril). She states she is having a fibromyalgia flare and her hands are aching. Patient Active Problem List    Diagnosis Date Noted    Chronic bilateral low back pain with left-sided sciatica 04/19/2017    Essential hypertension 04/19/2017    Allergy with anaphylaxis due to eggs 03/01/2017    Exercise-induced asthma 03/01/2017    Fibromyalgia 03/01/2016    Elevated BP 03/01/2016    History of hysterectomy for benign disease 03/01/2016    Hyperlipidemia 03/01/2016    History of lumbar laminectomy 03/01/2016    JENNIE on CPAP 03/01/2016    Depression with anxiety 03/01/2016    Obesity 10/07/2012    Precordial pain 10/07/2012    Thyroid nodule 04/19/2011     Current Outpatient Prescriptions   Medication Sig Dispense Refill    mometasone-formoterol (DULERA) 200-5 mcg/actuation HFA inhaler Take 2 Puffs by inhalation two (2) times a day. 1 Inhaler 5    oxyCODONE-acetaminophen (PERCOCET) 5-325 mg per tablet Take 1 Tab by mouth two (2) times daily as needed for Pain. Max Daily Amount: 2 Tabs. 10 Tab 0    DULoxetine (CYMBALTA) 60 mg capsule Take 1 Cap by mouth daily.  30 Cap 5    EPINEPHrine (EPIPEN) 0.3 mg/0.3 mL injection 0.3 mL by IntraMUSCular route once as needed for up to 1 dose. 1 Syringe 1    albuterol (PROVENTIL HFA, VENTOLIN HFA, PROAIR HFA) 90 mcg/actuation inhaler Take 1 Puff by inhalation every six (6) hours as needed for Wheezing. 1 Inhaler 5    amLODIPine-benazepril (LOTREL) 5-20 mg per capsule Take 1 Cap by mouth daily. 30 Cap 5    fluocinoNIDE (LIDEX) 0.05 % topical cream Apply  to affected area two (2) times daily as needed (eczema). 30 g 2    cetirizine (ZYRTEC) 10 mg tablet Take 1 Tab by mouth nightly. 30 Tab 11    ibuprofen (MOTRIN) 800 mg tablet Take 1 Tab by mouth every six (6) hours as needed for Pain (take with food). 60 Tab 5    traMADol (ULTRAM) 50 mg tablet Take 1 Tab by mouth every six (6) hours as needed for Pain. Max Daily Amount: 200 mg. 60 Tab 1     Allergies   Allergen Reactions    Egg Shortness of Breath and Nausea and Vomiting    Sulfa (Sulfonamide Antibiotics) Anaphylaxis, Shortness of Breath and Swelling    Broccoli Rash and Itching    Chicken Derived Nausea and Vomiting    Dairy Aid [Lactase] Swelling    Doxycycline Unknown (comments)    Gluten Swelling    Pravastatin Myalgia and Other (comments)     Blurry vision    Tetracycline Unknown (comments)    Wheat Nausea and Vomiting     Past Medical History:   Diagnosis Date    Abdominal abscess ([address]     Contact dermatitis and other eczema, due to unspecified cause     Depression     Dyslipidemia     Headache     History of mammography, screening 12/2013    Normal     Hypertension     Lumbar stenosis july 2012    Pleurisy     Psychiatric disorder     DEPRESSION.     Scoliosis      Family History   Problem Relation Age of Onset   [facility] Brenda Diabetes Mother     Thyroid Disease Mother     Thyroid Disease Father     Hypertension Father     Asthma Sister     MS Sister     Thyroid Disease Sister      Social History   Substance Use Topics    Smoking status: Former Smoker     Quit date: 9/12/2004    Smokeless tobacco: Never Used    Alcohol use 0.0 oz/week      Comment: 1 or 2 glass of wine a month         Review of Systems    A comprehensive review of systems was negative except for that written in the HPI. Objective:     Visit Vitals    Ht 5' 6\" (1.676 m)    Wt 218 lb (98.9 kg)    LMP 11/03/2010    BMI 35.19 kg/m2     General appearance: alert, cooperative, no distress, appears stated age  Head: Normocephalic, without obvious abnormality, atraumatic  Eyes: negative  Ears: abnormal TM AD - serous middle ear fluid, abnormal TM AS - serous middle ear fluid  Nose: Nares normal. Septum midline. Mucosa normal. No drainage or sinus tenderness. Throat: Lips, mucosa, and tongue normal. Teeth and gums normal  Neck: supple, symmetrical, trachea midline and no adenopathy  Back: symmetric, no curvature. ROM normal. No CVA tenderness. Lungs: clear to auscultation bilaterally  Heart: regular rate and rhythm, S1, S2 normal, no murmur, click, rub or gallop  Abdomen: soft, non-tender. Bowel sounds normal. No masses,  no organomegaly  Extremities: extremities normal, atraumatic, no cyanosis or edema  Pulses: 2+ and symmetric  Neurologic: Grossly normal  Nursing note and vitals reviewed  Assessment/Plan:       ICD-10-CM ICD-9-CM    1. Chronic cough R05 786.2 guaiFENesin-codeine (ROBITUSSIN AC) 100-10 mg/5 mL solution   2. Post-tussive emesis R11.10 787.03    3. Diarrhea, unspecified type R19.7 787.91    4. Essential hypertension I10 401.9 hydroCHLOROthiazide (HYDRODIURIL) 25 mg tablet      amLODIPine (NORVASC) 5 mg tablet     BP not well controlled but patient is also taking OTC cough medications, will discontinue lisinopril, she has tolerated HCTZ in the past. She will also start reglan (that she has at home) to control nausea. If cough improved before next week she will cancel pulmonology appointment. She will call for any worsening symptoms or inability to tolerate liquids.  Follow up in 4 weeks for CMP to test potassium    Follow-up Disposition:  Return in about 4 weeks (around 10/4/2017), or if symptoms worsen or fail to improve, for Follow up with pulmonology next week. Advised her to call back or return to office if symptoms worsen/change/persist.  Discussed expected course/resolution/complications of diagnosis in detail with patient. Medication risks/benefits/costs/interactions/alternatives discussed with patient. She was given an after visit summary which includes diagnoses, current medications, & vitals. She expressed understanding with the diagnosis and plan.

## 2017-09-06 NOTE — PROGRESS NOTES
Chief Complaint   Patient presents with    Follow-up    Cough     1. Have you been to the ER, urgent care clinic since your last visit? Hospitalized since your last visit? No    2. Have you seen or consulted any other health care providers outside of the [address] [place] [address] since your last visit? Include any pap smears or colon screening.   No

## 2017-09-06 NOTE — PATIENT INSTRUCTIONS
Chronic Cough: Care Instructions  Your Care Instructions    A cough is your body's response to something that bothers your throat or airways. Many things can cause a cough. You might cough because of a cold or the flu, bronchitis, or asthma. Smoking, postnasal drip, allergies, and stomach acid that backs up into your throat also can cause a cough. A cough can be short-term (acute) or long-term (chronic). A chronic cough lasts more than 3 weeks. A chronic cough is often caused by a long-term problem, such as asthma. Another cause might be a medicine, such as an ACE inhibitor. A cough is a symptom, not a disease. To treat a chronic cough, you may need to treat the problem that causes it. You can take a few steps at home to cough less and feel better. Some people cough or clear their throat out of habit for no clear reason. Follow-up care is a key part of your treatment and safety. Be sure to make and go to all appointments, and call your doctor if you are having problems. It's also a good idea to know your test results and keep a list of the medicines you take. How can you care for yourself at home? · Drink plenty of water and other fluids. This may help soothe a dry or sore throat. Honey or lemon juice in hot water or tea may ease a dry cough. · Prop up your head on pillows to help you breathe and ease a cough. · Do not smoke or allow others to smoke around you. Smoke can make a cough worse. If you need help quitting, talk to your doctor about stop-smoking programs and medicines. These can increase your chances of quitting for good. · Avoid exposure to smoke, dust, or other pollutants, or wear a face mask. Check with your doctor or pharmacist to find out which type of face mask will give you the most benefit. · Take cough medicine as directed by your doctor. · Try cough drops to soothe a dry or sore throat. Cough drops don't stop a cough.  Medicine-flavored cough drops are no better than candy-flavored drops or hard candy. Throat clearing  When you have a chronic cough or a disease that may cause this type of cough, you may often feel like you want to clear your throat. This helps bring up mucus. But throat clearing does not always have a cause. Throat clearing can become a habit. The more you do it, the more you feel like you need to do it. But frequent throat clearing can be hard on your vocal cords. It's like slamming them together. To help lessen throat clearing, you can try:  · Taking small sips of water. · Not clearing your throat when you feel you need to. · Swallowing hard when you want to clear your throat. You may want to ask your doctor if a medicine that thins mucus would help. When should you call for help? Call 911 anytime you think you may need emergency care. For example, call if:  · You have severe trouble breathing. Call your doctor now or seek immediate medical care if:  · You cough up blood. · You have new or worse trouble breathing. · You have a new or higher fever. Watch closely for changes in your health, and be sure to contact your doctor if:  · You cough more deeply or more often, especially if you notice more mucus or a change in the color of your mucus. · You do not get better as expected. Where can you learn more? Go to http://sunshine-kandi.info/. Enter V307 in the search box to learn more about \"Chronic Cough: Care Instructions. \"  Current as of: March 25, 2017  Content Version: 11.3  © 5047-9483 [employer]. Care instructions adapted under license by [employer] (which disclaims liability or warranty for this information). If you have questions about a medical condition or this instruction, always ask your healthcare professional. [address] any warranty or liability for your use of this information.

## 2017-09-06 NOTE — MR AVS SNAPSHOT
Visit Information Date & Time Provider Department Dept. Phone Encounter #  
 9/6/2017  1:45 PM Rita Vanessa, [address] Internal Medicine [phone] [id number] Follow-up Instructions Return if symptoms worsen or fail to improve, for Follow up with pulmonology next week. Upcoming Health Maintenance Date Due Pneumococcal 19-64 Medium Risk (1 of 1 - PPSV23) 9/16/1985 FOBT Q 1 YEAR AGE 50-75 9/16/2016 INFLUENZA AGE 9 TO ADULT 8/1/2017 BREAST CANCER SCRN MAMMOGRAM 3/3/2019 PAP AKA CERVICAL CYTOLOGY 3/1/2020 DTaP/Tdap/Td series (2 - Td) 7/5/2027 Allergies as of 9/6/2017  Review Complete On: 9/6/2017 By: Rita Vanessa, OBEY Severity Noted Reaction Type Reactions Egg High 01/28/2011    Shortness of Breath, Nausea and Vomiting  
 Sulfa (Sulfonamide Antibiotics) High 11/12/2010    Anaphylaxis, Shortness of Breath, Swelling Broccoli  01/28/2011    Rash, Itching Chicken Derived  02/11/2013    Nausea and Vomiting Dairy Aid [Lactase]  01/28/2011    Swelling Doxycycline  11/12/2010    Unknown (comments) Gluten  01/28/2011    Swelling Pravastatin  09/04/2012    Myalgia, Other (comments) Blurry vision Tetracycline  11/12/2010    Unknown (comments) Wheat  01/28/2011    Nausea and Vomiting Current Immunizations  Reviewed on 7/5/2017 Name Date Influenza Vaccine PF  Deferred (Patient Refused) Pneumococcal Conjugate (PCV-13)  Deferred (Patient Refused) Tdap 7/5/2017 Not reviewed this visit You Were Diagnosed With   
  
 Codes Comments Chronic cough    -  Primary ICD-10-CM: M86 ICD-9-CM: 786.2 Post-tussive emesis     ICD-10-CM: R11.10 ICD-9-CM: 787.03 Diarrhea, unspecified type     ICD-10-CM: R19.7 ICD-9-CM: 787.91 Vitals BP Pulse Temp Resp Height(growth percentile) Weight(growth percentile) (!) 134/97 93 97 °F (36.1 °C) (Oral) 18 5' 6\" (1.676 m) 218 lb (98.9 kg) LMP SpO2 BMI OB Status Smoking Status 11/03/2010 (Exact Date) 98% 35.19 kg/m2 Hysterectomy Former Smoker Vitals History BMI and BSA Data Body Mass Index Body Surface Area  
 35.19 kg/m 2 2.15 m 2 Preferred Pharmacy Pharmacy Name Phone Justin Lockhart [address] [facility], [address] [place] [address] [phone] Your Updated Medication List  
  
   
This list is accurate as of: 9/6/17  1:55 PM.  Always use your most recent med list.  
  
  
  
  
 albuterol 90 mcg/actuation inhaler Commonly known as:  PROVENTIL HFA, VENTOLIN HFA, PROAIR HFA Take 1 Puff by inhalation every six (6) hours as needed for Wheezing. amLODIPine-benazepril 5-20 mg per capsule Commonly known as:  Elester Rajas Take 1 Cap by mouth daily. cetirizine 10 mg tablet Commonly known as:  ZYRTEC Take 1 Tab by mouth nightly. DULoxetine 60 mg capsule Commonly known as:  CYMBALTA Take 1 Cap by mouth daily. EPINEPHrine 0.3 mg/0.3 mL injection Commonly known as:  EPIPEN  
0.3 mL by IntraMUSCular route once as needed for up to 1 dose. fluocinoNIDE 0.05 % topical cream  
Commonly known as:  LIDEX Apply  to affected area two (2) times daily as needed (eczema). guaiFENesin-codeine 100-10 mg/5 mL solution Commonly known as:  ROBITUSSIN AC Take 5 mL by mouth three (3) times daily as needed for Cough. Max Daily Amount: 15 mL. ibuprofen 800 mg tablet Commonly known as:  MOTRIN Take 1 Tab by mouth every six (6) hours as needed for Pain (take with food). mometasone-formoterol 200-5 mcg/actuation HFA inhaler Commonly known as:  Threasa Center Take 2 Puffs by inhalation two (2) times a day. oxyCODONE-acetaminophen 5-325 mg per tablet Commonly known as:  PERCOCET Take 1 Tab by mouth two (2) times daily as needed for Pain. Max Daily Amount: 2 Tabs. traMADol 50 mg tablet Commonly known as:  Alfredo Sicks  
 Take 1 Tab by mouth every six (6) hours as needed for Pain. Max Daily Amount: 200 mg. Prescriptions Printed Refills  
 guaiFENesin-codeine (ROBITUSSIN AC) 100-10 mg/5 mL solution 0 Sig: Take 5 mL by mouth three (3) times daily as needed for Cough. Max Daily Amount: 15 mL. Class: Print Route: Oral  
  
Follow-up Instructions Return if symptoms worsen or fail to improve, for Follow up with pulmonology next week. Patient Instructions Chronic Cough: Care Instructions Your Care Instructions A cough is your body's response to something that bothers your throat or airways. Many things can cause a cough. You might cough because of a cold or the flu, bronchitis, or asthma. Smoking, postnasal drip, allergies, and stomach acid that backs up into your throat also can cause a cough. A cough can be short-term (acute) or long-term (chronic). A chronic cough lasts more than 3 weeks. A chronic cough is often caused by a long-term problem, such as asthma. Another cause might be a medicine, such as an ACE inhibitor. A cough is a symptom, not a disease. To treat a chronic cough, you may need to treat the problem that causes it. You can take a few steps at home to cough less and feel better. Some people cough or clear their throat out of habit for no clear reason. Follow-up care is a key part of your treatment and safety. Be sure to make and go to all appointments, and call your doctor if you are having problems. It's also a good idea to know your test results and keep a list of the medicines you take. How can you care for yourself at home? · Drink plenty of water and other fluids. This may help soothe a dry or sore throat. Honey or lemon juice in hot water or tea may ease a dry cough. · Prop up your head on pillows to help you breathe and ease a cough. · Do not smoke or allow others to smoke around you.  Smoke can make a cough worse. If you need help quitting, talk to your doctor about stop-smoking programs and medicines. These can increase your chances of quitting for good. · Avoid exposure to smoke, dust, or other pollutants, or wear a face mask. Check with your doctor or pharmacist to find out which type of face mask will give you the most benefit. · Take cough medicine as directed by your doctor. · Try cough drops to soothe a dry or sore throat. Cough drops don't stop a cough. Medicine-flavored cough drops are no better than candy-flavored drops or hard candy. Throat clearing When you have a chronic cough or a disease that may cause this type of cough, you may often feel like you want to clear your throat. This helps bring up mucus. But throat clearing does not always have a cause. Throat clearing can become a habit. The more you do it, the more you feel like you need to do it. But frequent throat clearing can be hard on your vocal cords. It's like slamming them together. To help lessen throat clearing, you can try: · Taking small sips of water. · Not clearing your throat when you feel you need to. · Swallowing hard when you want to clear your throat. You may want to ask your doctor if a medicine that thins mucus would help. When should you call for help? Call 911 anytime you think you may need emergency care. For example, call if: 
· You have severe trouble breathing. Call your doctor now or seek immediate medical care if: 
· You cough up blood. · You have new or worse trouble breathing. · You have a new or higher fever. Watch closely for changes in your health, and be sure to contact your doctor if: 
· You cough more deeply or more often, especially if you notice more mucus or a change in the color of your mucus. · You do not get better as expected. Where can you learn more? Go to http://sunshine-kandi.info/. Enter U922 in the search box to learn more about \"Chronic Cough: Care Instructions. \" 
 Current as of: March 25, 2017 Content Version: 11.3 © 9892-2410 [employer], [employer]. Care instructions adapted under license by [employer] (which disclaims liability or warranty for this information). If you have questions about a medical condition or this instruction, always ask your healthcare professional. [address] any warranty or liability for your use of this information. Introducing [facility] & HEALTH SERVICES! Dear DANYA: Thank you for requesting a Nautit account. Our records indicate that you already have an active Nautit account. You can access your account anytime at [email or web address] Did you know that you can access your hospital and ER discharge instructions at any time in Nautit? You can also review all of your test results from your hospital stay or ER visit. Additional Information If you have questions, please visit the Frequently Asked Questions section of the Nautit website at [email or web address] Remember, Nautit is NOT to be used for urgent needs. For medical emergencies, dial 911. Now available from your iPhone and Android! Please provide this summary of care documentation to your next provider. Your primary care clinician is listed as Lizzie Vasquez. If you have any questions after today's visit, please call [phone].

## 2017-09-24 ENCOUNTER — PATIENT MESSAGE (OUTPATIENT)
Dept: INTERNAL MEDICINE CLINIC | Facility: CLINIC | Age: 52
End: 2017-09-24

## 2017-09-25 DIAGNOSIS — I10 ESSENTIAL HYPERTENSION: ICD-10-CM

## 2017-09-25 RX ORDER — HYDROCHLOROTHIAZIDE 25 MG/1
TABLET ORAL
Qty: 30 TAB | Refills: 0 | Status: SHIPPED | OUTPATIENT
Start: 2017-09-25 | End: 2017-11-28 | Stop reason: SDUPTHER

## 2017-09-25 RX ORDER — AMLODIPINE BESYLATE 10 MG/1
10 TABLET ORAL DAILY
Qty: 30 TAB | Refills: 5 | Status: SHIPPED | OUTPATIENT
Start: 2017-09-25 | End: 2017-12-24 | Stop reason: SDUPTHER

## 2017-09-25 NOTE — TELEPHONE ENCOUNTER
From: Nettie Wilson  To: Janessa Briggs PA-C  Sent: 9/24/2017 11:14 PM EDT  Subject: Non-Urgent Medical Question    Good evening. Once we changed the BP meds the coughing stopped about 3 days later. Unfortunately my BP is staying around 140/110. Causing headaches and nausea. Other than that Im fine.

## 2017-10-12 ENCOUNTER — PATIENT MESSAGE (OUTPATIENT)
Dept: INTERNAL MEDICINE CLINIC | Facility: CLINIC | Age: 52
End: 2017-10-12

## 2017-10-12 ENCOUNTER — OFFICE VISIT (OUTPATIENT)
Dept: INTERNAL MEDICINE CLINIC | Facility: CLINIC | Age: 52
End: 2017-10-12

## 2017-10-12 VITALS
TEMPERATURE: 97.2 F | OXYGEN SATURATION: 96 % | SYSTOLIC BLOOD PRESSURE: 123 MMHG | WEIGHT: 213.2 LBS | BODY MASS INDEX: 34.27 KG/M2 | HEART RATE: 72 BPM | RESPIRATION RATE: 18 BRPM | DIASTOLIC BLOOD PRESSURE: 82 MMHG | HEIGHT: 66 IN

## 2017-10-12 DIAGNOSIS — Z98.890 HISTORY OF LUMBAR LAMINECTOMY: Primary | ICD-10-CM

## 2017-10-12 DIAGNOSIS — K29.00 ACUTE GASTRITIS WITHOUT HEMORRHAGE, UNSPECIFIED GASTRITIS TYPE: ICD-10-CM

## 2017-10-12 DIAGNOSIS — G89.29 CHRONIC BILATERAL LOW BACK PAIN WITH LEFT-SIDED SCIATICA: Primary | ICD-10-CM

## 2017-10-12 DIAGNOSIS — M54.41 CHRONIC BILATERAL LOW BACK PAIN WITH BILATERAL SCIATICA: ICD-10-CM

## 2017-10-12 DIAGNOSIS — G89.29 CHRONIC BILATERAL LOW BACK PAIN WITH BILATERAL SCIATICA: ICD-10-CM

## 2017-10-12 DIAGNOSIS — M54.42 CHRONIC BILATERAL LOW BACK PAIN WITH LEFT-SIDED SCIATICA: Primary | ICD-10-CM

## 2017-10-12 DIAGNOSIS — M54.42 CHRONIC BILATERAL LOW BACK PAIN WITH BILATERAL SCIATICA: ICD-10-CM

## 2017-10-12 RX ORDER — RANITIDINE 150 MG/1
150 TABLET, FILM COATED ORAL 2 TIMES DAILY
Qty: 60 TAB | Refills: 0 | Status: SHIPPED | OUTPATIENT
Start: 2017-10-12 | End: 2022-05-04

## 2017-10-12 NOTE — TELEPHONE ENCOUNTER
From: Samara Higgins  To: Valerie Toussaint PA-C  Sent: 10/12/2017 12:14 PM EDT  Subject: Referral Request     Good afternoon. I need to change my referral from Dr. Alexandra Perdomo to another doctor because there seems to be an issue with an outstanding bill Dr. Miller Innocent office and I dont have $2000 right now. Dr. Aliya [id number]  [place]  [phone]  JEL:[phone]    Appt sched Tues, Nov 21 @ 8:a.m.     erna

## 2017-10-12 NOTE — MR AVS SNAPSHOT
Visit Information Date & Time Provider Department Dept. Phone Encounter #  
 10/12/2017 10:00 AM Joanne White, [address] Internal Medicine [phone] [id number] Upcoming Health Maintenance Date Due Pneumococcal 19-64 Medium Risk (1 of 1 - PPSV23) 9/16/1985 FOBT Q 1 YEAR AGE 50-75 9/16/2016 INFLUENZA AGE 9 TO ADULT 8/1/2017 BREAST CANCER SCRN MAMMOGRAM 3/3/2019 PAP AKA CERVICAL CYTOLOGY 3/1/2020 DTaP/Tdap/Td series (2 - Td) 7/5/2027 Allergies as of 10/12/2017  Review Complete On: 10/12/2017 By: Joanne White PA-C Severity Noted Reaction Type Reactions Egg High 01/28/2011    Shortness of Breath, Nausea and Vomiting  
 Sulfa (Sulfonamide Antibiotics) High 11/12/2010    Anaphylaxis, Shortness of Breath, Swelling Broccoli  01/28/2011    Rash, Itching Chicken Derived  02/11/2013    Nausea and Vomiting Dairy Aid [Lactase]  01/28/2011    Swelling Doxycycline  11/12/2010    Unknown (comments) Gluten  01/28/2011    Swelling Pravastatin  09/04/2012    Myalgia, Other (comments) Blurry vision Tetracycline  11/12/2010    Unknown (comments) Wheat  01/28/2011    Nausea and Vomiting Ace Inhibitors Low 09/25/2017   Side Effect Cough Current Immunizations  Reviewed on 7/5/2017 Name Date Influenza Vaccine PF  Deferred (Patient Refused) Pneumococcal Conjugate (PCV-13)  Deferred (Patient Refused) Tdap 7/5/2017 Not reviewed this visit You Were Diagnosed With   
  
 Codes Comments History of lumbar laminectomy    -  Primary ICD-10-CM: X55.460 ICD-9-CM: V45.89 Chronic bilateral low back pain with bilateral sciatica     ICD-10-CM: M54.42, M54.41, G89.29 ICD-9-CM: 724.2, 724.3, 338.29 Vitals BP Pulse Temp Resp Height(growth percentile) Weight(growth percentile)  123/82 (BP 1 Location: Left arm, BP Patient Position: Sitting) 72 97.2 °F (36.2 °C) (Oral) 18 5' 6\" (1.676 m) 213 lb 3.2 oz (96.7 kg) LMP SpO2 BMI OB Status Smoking Status 11/03/2010 (Exact Date) 96% 34.41 kg/m2 Hysterectomy Former Smoker Vitals History BMI and BSA Data Body Mass Index Body Surface Area 34.41 kg/m 2 2.12 m 2 Preferred Pharmacy Pharmacy Name Phone [facility] [phone] [facility] [phone] Your Updated Medication List  
  
   
This list is accurate as of: 10/12/17 10:26 AM.  Always use your most recent med list.  
  
  
  
  
 albuterol 90 mcg/actuation inhaler Commonly known as:  PROVENTIL HFA, VENTOLIN HFA, PROAIR HFA Take 1 Puff by inhalation every six (6) hours as needed for Wheezing. amLODIPine 10 mg tablet Commonly known as:  Helen Sunny Take 1 Tab by mouth daily. cetirizine 10 mg tablet Commonly known as:  ZYRTEC Take 1 Tab by mouth nightly. DULoxetine 60 mg capsule Commonly known as:  CYMBALTA Take 1 Cap by mouth daily. EPINEPHrine 0.3 mg/0.3 mL injection Commonly known as:  EPIPEN  
0.3 mL by IntraMUSCular route once as needed for up to 1 dose. fluocinoNIDE 0.05 % topical cream  
Commonly known as:  LIDEX APPLY TO AFFECTED AREA(S) TWO TIMES A DAY  
  
 hydroCHLOROthiazide 25 mg tablet Commonly known as:  HYDRODIURIL  
TAKE ONE TABLET BY MOUTH DAILY  
  
 ibuprofen 800 mg tablet Commonly known as:  MOTRIN Take 1 Tab by mouth every six (6) hours as needed for Pain (take with food). mometasone-formoterol 200-5 mcg/actuation HFA inhaler Commonly known as:  Jurline Maul Take 2 Puffs by inhalation two (2) times a day. oxyCODONE-acetaminophen 5-325 mg per tablet Commonly known as:  PERCOCET Take 1 Tab by mouth two (2) times daily as needed for Pain. Max Daily Amount: 2 Tabs. traMADol 50 mg tablet Commonly known as:  ULTRAM  
Take 1 Tab by mouth every six (6) hours as needed for Pain. Max Daily Amount: 200 mg. We Performed the Following REFERRAL TO ORTHOPEDIC SURGERY [REF62 Custom] To-Do List   
 Around 10/12/2017 Imaging:  MRI LUMB SPINE W WO CONT Referral Information Referral ID Referred By Referred To  
  
 [id number] [facility] [id number], [address] Visits Status Start Date End Date 1 New Request 10/12/17 10/12/18 If your referral has a status of pending review or denied, additional information will be sent to support the outcome of this decision. Referral ID Referred By Referred To  
 [id number] [facility] Not Available Visits Status Start Date End Date 1 New Request 10/12/17 10/12/18 If your referral has a status of pending review or denied, additional information will be sent to support the outcome of this decision. Introducing [facility] & HEALTH SERVICES! Dear Dave Palomino: Thank you for requesting a DramaFever account. Our records indicate that you already have an active DramaFever account. You can access your account anytime at [email or web address] Did you know that you can access your hospital and ER discharge instructions at any time in DramaFever? You can also review all of your test results from your hospital stay or ER visit. Additional Information If you have questions, please visit the Frequently Asked Questions section of the DramaFever website at [email or web address] Remember, DramaFever is NOT to be used for urgent needs. For medical emergencies, dial 911. Now available from your iPhone and Android! Please provide this summary of care documentation to your next provider. Your primary care clinician is listed as Ana Smyth. If you have any questions after today's visit, please call [phone].

## 2017-10-12 NOTE — PROGRESS NOTES
Chief Complaint   Patient presents with    Leg Pain     Left leg pain. Patient states her hands have pain. Room 7     Patient states the pain medication she has is not helping the pain    1. Have you been to the ER, urgent care clinic since your last visit? Hospitalized since your last visit? No    2. Have you seen or consulted any other health care providers outside of the [address] [place] [address] since your last visit? Include any pap smears or colon screening.  No     Health Maintenance Due   Topic Date Due    Pneumococcal 19-64 Medium Risk (1 of 1 - PPSV23) 09/16/1985    FOBT Q 1 YEAR AGE 50-75  09/16/2016    INFLUENZA AGE 9 TO ADULT  08/01/2017

## 2017-10-12 NOTE — PROGRESS NOTES
HISTORY OF PRESENT ILLNESS  Jesse Gallardo is a 47 y.o. female. Chief Complaint   Patient presents with    Leg Pain     Left leg pain. Patient states her hands have pain. Room 7     HPI  1) Hx Fibromyalgia - treated with Cymbalta daily. Pt notes that she is currently having a Fibromyalgia flare and has pain \"everywhere\" including back, arms, hands, legs, feet. 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 Had a new symptom this week: was in the shower, and left leg became numb and stopped supporting her weight. Pt lowered herself down to the floor of the shower and crawled out so that she would not fall. Hx Lumbar Discectomy in 2013. Has not seen Ortho (Dr. Christiano Zavala) since 2014. Pt notes that she has had mild stomach discomfort, nausea, occasional vomiting with recent need for routine Ibuprofen/Tramadol/left over narcotic pain medication. She is eating with every dose. She has noted no melena, nor hematemesis. Review of Systems   Constitutional: Positive for malaise/fatigue. Negative for fever. Respiratory: Negative for shortness of breath. Gastrointestinal: Positive for abdominal pain, heartburn, nausea and vomiting. Negative for blood in stool and melena. Musculoskeletal: Positive for back pain, joint pain, myalgias and neck pain. Negative for falls. Neurological: Positive for tingling, sensory change and focal weakness. Psychiatric/Behavioral: Positive for depression. Negative for suicidal ideas. Physical Exam   Constitutional: She is oriented to person, place, and time. She appears well-developed and well-nourished. No distress. HENT:   Head: Normocephalic and atraumatic. Neck: Neck supple. No JVD present. Cardiovascular: Normal rate, regular rhythm and normal heart sounds. Pulmonary/Chest: Effort normal and breath sounds normal. No respiratory distress. Musculoskeletal: She exhibits no edema. Spasm throughout back. SLR negative B. Neurological: She is alert and oriented to person, place, and time. She has normal reflexes. Coordination normal.   Vibratory sensation decreased B lateral ankles. Skin: Skin is warm and dry. Psychiatric: She has a normal mood and affect. Her behavior is normal. Judgment and thought content normal.   Nursing note and vitals reviewed. ASSESSMENT and PLAN    ICD-10-CM ICD-9-CM    1. History of lumbar laminectomy Z98.890 V45.89 REFERRAL TO ORTHOPEDIC SURGERY      MRI LUMB SPINE W WO CONT   2. Chronic bilateral low back pain with bilateral sciatica M54.42 724.2 REFERRAL TO ORTHOPEDIC SURGERY    M54.41 724.3 MRI LUMB SPINE W WO CONT    G89.29 338.29    3. Acute gastritis without hemorrhage, unspecified gastritis type K29.00 535.00 raNITIdine (ZANTAC) 150 mg tablet   Advised pt to go to ER if melena or hematemesis develops. Suspect current gastritis is mild enough to be manageable with Zantac.

## 2017-10-16 ENCOUNTER — HOSPITAL ENCOUNTER (EMERGENCY)
Age: 52
Discharge: HOME | End: 2017-10-16
Attending: EMERGENCY MEDICINE
Payer: COMMERCIAL

## 2017-10-16 VITALS
RESPIRATION RATE: 24 BRPM | DIASTOLIC BLOOD PRESSURE: 80 MMHG | HEIGHT: 67 IN | OXYGEN SATURATION: 99 % | HEART RATE: 71 BPM | SYSTOLIC BLOOD PRESSURE: 147 MMHG | TEMPERATURE: 98 F | WEIGHT: 210 LBS | BODY MASS INDEX: 32.96 KG/M2

## 2017-10-16 DIAGNOSIS — M54.50 ACUTE EXACERBATION OF CHRONIC LOW BACK PAIN: Primary | ICD-10-CM

## 2017-10-16 DIAGNOSIS — J40 BRONCHITIS: ICD-10-CM

## 2017-10-16 DIAGNOSIS — G89.29 ACUTE EXACERBATION OF CHRONIC LOW BACK PAIN: Primary | ICD-10-CM

## 2017-10-16 DIAGNOSIS — R05.9 COUGH: ICD-10-CM

## 2017-10-16 PROCEDURE — 74011250636 HC RX REV CODE- 250/636: Performed by: PHYSICIAN ASSISTANT

## 2017-10-16 PROCEDURE — 99281 EMR DPT VST MAYX REQ PHY/QHP: CPT

## 2017-10-16 PROCEDURE — 96372 THER/PROPH/DIAG INJ SC/IM: CPT

## 2017-10-16 RX ORDER — OXYCODONE AND ACETAMINOPHEN 7.5; 325 MG/1; MG/1
1 TABLET ORAL
Qty: 10 TAB | Refills: 0 | Status: SHIPPED | OUTPATIENT
Start: 2017-10-16 | End: 2017-10-24

## 2017-10-16 RX ORDER — LIDOCAINE 50 MG/G
PATCH TOPICAL
Qty: 5 EACH | Refills: 0 | Status: SHIPPED | OUTPATIENT
Start: 2017-10-16 | End: 2017-10-24

## 2017-10-16 RX ORDER — PREDNISONE 10 MG/1
TABLET ORAL
Qty: 21 TAB | Refills: 0 | Status: SHIPPED | OUTPATIENT
Start: 2017-10-16 | End: 2017-10-24

## 2017-10-16 RX ORDER — HYDROMORPHONE HYDROCHLORIDE 2 MG/ML
1 INJECTION, SOLUTION INTRAMUSCULAR; INTRAVENOUS; SUBCUTANEOUS ONCE
Status: COMPLETED | OUTPATIENT
Start: 2017-10-16 | End: 2017-10-16

## 2017-10-16 RX ADMIN — HYDROMORPHONE HYDROCHLORIDE 1 MG: 2 INJECTION, SOLUTION INTRAMUSCULAR; INTRAVENOUS; SUBCUTANEOUS at 17:06

## 2017-10-16 NOTE — ED PROVIDER NOTES
Patient is a 47 y.o. female presenting with back pain. Back Pain    This is a chronic (pt has hx of chronic back pain - s/p laminectomy. ) problem. Episode onset: last week pt states she had a \"tumble\" out of the tub but was taking motrin and ultram with relief. The problem has been gradually worsening (last night pt states pain worsened and meds stopped helping with pain. Pt denies any new injury or trauma. Pt did see PCP last week after fall who referred her to ortho and scheduled an outpatient MRI). Patient reports not work related injury. The pain is associated with no known injury. The pain is present in the lower back, left side and right side. The quality of the pain is described as sharp. The pain radiates to the left knee, left thigh and left foot. The pain is at a severity of 10/10. The pain is severe. The symptoms are aggravated by certain positions, bending and twisting. Associated symptoms include numbness (to the L foot- since surgery), leg pain and paresthesias. Pertinent negatives include no fever, no abdominal pain, no bowel incontinence, no bladder incontinence, no dysuria, no pelvic pain, no paresis, no tingling and no weakness. She has tried NSAIDs, analgesics and muscle relaxants for the symptoms. The treatment provided no relief. The patient's surgical history includes laminectomy. Past Medical History:   Diagnosis Date    Abdominal abscess ([address]     Contact dermatitis and other eczema, due to unspecified cause     Depression     Dyslipidemia     Headache(784.0)     History of mammography, screening 12/2013    Normal     Hypertension     Lumbar stenosis july 2012    Pleurisy     Psychiatric disorder     DEPRESSION.     Scoliosis        Past Surgical History:   Procedure Laterality Date    ABDOMEN SURGERY PROC UNLISTED      abdominal abscess drainage    ENDOSCOPY, COLON, DIAGNOSTIC      HX COLONOSCOPY      HX ENDOSCOPY      HX GI      COLONOSCOPY X 1    HX GYN  , tubal ligation     HX GYN      hysterectomy secondary to dysmenorrhea    HX ORTHOPAEDIC      ganglian cyst removed from right foot  &          Family History:   Problem Relation Age of Onset   [facility] Diabetes Mother     Thyroid Disease Mother     Thyroid Disease Father     Hypertension Father     Asthma Sister     MS Sister     Thyroid Disease Sister        Social History     Social History    Marital status:      Spouse name: N/A    Number of children: N/A    Years of education: N/A     Occupational History    Not on file. Social History Main Topics    Smoking status: Former Smoker     Quit date: 2004    Smokeless tobacco: Never Used    Alcohol use 0.0 oz/week      Comment: 1 or 2 glass of wine a month     Drug use: No    Sexual activity: Yes     Partners: Male      Comment: single     Other Topics Concern    Not on file     Social History Narrative         ALLERGIES: Egg; Sulfa (sulfonamide antibiotics); Broccoli; Chicken derived; Dairy aid [lactase]; Doxycycline; Gluten; Pravastatin; Tetracycline; Wheat; and Ace inhibitors    Review of Systems   Constitutional: Negative for fever. Gastrointestinal: Negative for abdominal pain and bowel incontinence. Genitourinary: Negative for bladder incontinence, dysuria and pelvic pain. Musculoskeletal: Positive for back pain and myalgias (pt has hx of fibromyalgia in addition to chronic back pain). Negative for joint swelling and neck pain. Neurological: Positive for numbness (to the L foot- since surgery) and paresthesias. Negative for tingling, speech difficulty and weakness. Psychiatric/Behavioral: Negative for self-injury. All other systems reviewed and are negative.       Vitals:    10/16/17 1636   BP: 147/80   Pulse: 71   Resp: 24   Temp: 98 °F (36.7 °C)   SpO2: 99%   Weight: 95.3 kg (210 lb)   Height: 5' 6.5\" (1.689 m)            Physical Exam   Constitutional: She is oriented to person, place, and time. She appears well-developed and well-nourished. No distress. HENT:   Head: Normocephalic and atraumatic. Eyes: Conjunctivae are normal.   Neck: Normal range of motion. Cardiovascular: Normal rate, regular rhythm, normal heart sounds, intact distal pulses and normal pulses. Pulmonary/Chest: Effort normal and breath sounds normal. No respiratory distress. She has no wheezes. She has no rales. Musculoskeletal:        Lumbar back: She exhibits decreased range of motion (exam limited secondary to pain), tenderness and pain. She exhibits no bony tenderness, no swelling, no edema, no deformity and normal pulse. Back:    Neurological: She is alert and oriented to person, place, and time. Skin: Skin is warm and dry. Psychiatric: She has a normal mood and affect. Her behavior is normal. Judgment and thought content normal.   Nursing note and vitals reviewed. MDM  Number of Diagnoses or Management Options  Diagnosis management comments: DDX: acute exacerbation of chronic back pain, fibromyalgia, mm spasm    ED Course       Procedures    MEDICATIONS GIVEN:  Medications   HYDROmorphone (PF) (DILAUDID) injection 1 mg (1 mg IntraMUSCular Given 10/16/17 1706)         PROGRESS NOTE:  A/P  5:30 PM  Pt pain improved to 5/10. The patient's signs, symptoms, diagnosis, and discharge instruction have been discussed and the patient has conveyed their understanding. The patient is to follow up with PCP or return to the ER should their symptoms worsen. Pt advised MRI had been ordered and based on notes they tried to set up appt time. Plan has been discussed and the patient is in agreement. Pt is ready for discharge        DIAGNOSIS:    1. Acute exacerbation of chronic low back pain    2. Cough    3.  Bronchitis        PLAN:  Follow-up Information     Follow up With Details Comments Contact Info    Robert Garcia PA-C Schedule an appointment as soon as possible for a visit in 2 days As needed [address]  [address] Jeniffer Cheema [address] [facility] [place] [address]  [phone]          Discharge Medication List as of 10/16/2017  5:30 PM      START taking these medications    Details   oxyCODONE-acetaminophen (PERCOCET) 7.5-325 mg per tablet Take 1 Tab by mouth every eight (8) hours as needed for Pain. Max Daily Amount: 3 Tabs., Print, Disp-10 Tab, R-0      lidocaine (LIDODERM) 5 % Apply patch to the affected area for 12 hours a day and remove for 12 hours a day., Print, Disp-5 Each, R-0      predniSONE (STERAPRED DS) 10 mg dose pack Take as instructed, Print, Disp-21 Tab, R-0         CONTINUE these medications which have NOT CHANGED    Details   amLODIPine (NORVASC) 10 mg tablet Take 1 Tab by mouth daily. , Normal, Disp-30 Tab, R-5      hydroCHLOROthiazide (HYDRODIURIL) 25 mg tablet TAKE ONE TABLET BY MOUTH DAILY, Normal, Disp-30 Tab, R-0      DULoxetine (CYMBALTA) 60 mg capsule Take 1 Cap by mouth daily. , Normal, Disp-30 Cap, R-5      cetirizine (ZYRTEC) 10 mg tablet Take 1 Tab by mouth nightly., Normal, Disp-30 Tab, R-11      ibuprofen (MOTRIN) 800 mg tablet Take 1 Tab by mouth every six (6) hours as needed for Pain (take with food). , Normal, Disp-60 Tab, R-5      traMADol (ULTRAM) 50 mg tablet Take 1 Tab by mouth every six (6) hours as needed for Pain.  Max Daily Amount: 200 mg., Print, Disp-60 Tab, R-1      raNITIdine (ZANTAC) 150 mg tablet Take 1 Tab by mouth two (2) times a day., Normal, Disp-60 Tab, R-0      fluocinoNIDE (LIDEX) 0.05 % topical cream APPLY TO AFFECTED AREA(S) TWO TIMES A DAY, Normal, Disp-30 g, R-1      mometasone-formoterol (DULERA) 200-5 mcg/actuation HFA inhaler Take 2 Puffs by inhalation two (2) times a day., Normal, Disp-1 Inhaler, R-5      EPINEPHrine (EPIPEN) 0.3 mg/0.3 mL injection 0.3 mL by IntraMUSCular route once as needed for up to 1 dose., Normal, Disp-1 Syringe, R-1      albuterol (PROVENTIL HFA, VENTOLIN HFA, PROAIR HFA) 90 mcg/actuation inhaler Take 1 Puff by inhalation every six (6) hours as needed for Wheezing., Normal, Disp-1 Inhaler, R-5         STOP taking these medications       oxyCODONE-acetaminophen (PERCOCET) 5-325 mg per tablet Comments:   Reason for Stopping:

## 2017-10-16 NOTE — DISCHARGE INSTRUCTIONS
Back Pain: Care Instructions  Your Care Instructions    Back pain has many possible causes. It is often related to problems with muscles and ligaments of the back. It may also be related to problems with the nerves, discs, or bones of the back. Moving, lifting, standing, sitting, or sleeping in an awkward way can strain the back. Sometimes you don't notice the injury until later. Arthritis is another common cause of back pain. Although it may hurt a lot, back pain usually improves on its own within several weeks. Most people recover in 12 weeks or less. Using good home treatment and being careful not to stress your back can help you feel better sooner. Follow-up care is a key part of your treatment and safety. Be sure to make and go to all appointments, and call your doctor if you are having problems. Its also a good idea to know your test results and keep a list of the medicines you take. How can you care for yourself at home? · Sit or lie in positions that are most comfortable and reduce your pain. Try one of these positions when you lie down:  ¨ Lie on your back with your knees bent and supported by large pillows. ¨ Lie on the floor with your legs on the seat of a sofa or chair. Chapar[employer] on your side with your knees and hips bent and a pillow between your legs. ¨ Lie on your stomach if it does not make pain worse. · Do not sit up in bed, and avoid soft couches and twisted positions. Bed rest can help relieve pain at first, but it delays healing. Avoid bed rest after the first day of back pain. · Change positions every 30 minutes. If you must sit for long periods of time, take breaks from sitting. Get up and walk around, or lie in a comfortable position. · Try using a heating pad on a low or medium setting for 15 to 20 minutes every 2 or 3 hours. Try a warm shower in place of one session with the heating pad. · You can also try an ice pack for 10 to 15 minutes every 2 to 3 hours.  Put a thin cloth between the ice pack and your skin. · Take pain medicines exactly as directed. ¨ If the doctor gave you a prescription medicine for pain, take it as prescribed. ¨ If you are not taking a prescription pain medicine, ask your doctor if you can take an over-the-counter medicine. · Take short walks several times a day. You can start with 5 to 10 minutes, 3 or 4 times a day, and work up to longer walks. Walk on level surfaces and avoid hills and stairs until your back is better. · Return to work and other activities as soon as you can. Continued rest without activity is usually not good for your back. · To prevent future back pain, do exercises to stretch and strengthen your back and stomach. Learn how to use good posture, safe lifting techniques, and proper body mechanics. When should you call for help? Call your doctor now or seek immediate medical care if:  · You have new or worsening numbness in your legs. · You have new or worsening weakness in your legs. (This could make it hard to stand up.)  · You lose control of your bladder or bowels. Watch closely for changes in your health, and be sure to contact your doctor if:  · Your pain gets worse. · You are not getting better after 2 weeks. Where can you learn more? Go to http://sunshine-kandi.info/. Enter N520 in the search box to learn more about \"Back Pain: Care Instructions. \"  Current as of: March 21, 2017  Content Version: 11.3  © 9954-7647 [employer]. Care instructions adapted under license by [employer] (which disclaims liability or warranty for this information). If you have questions about a medical condition or this instruction, always ask your healthcare professional. [address] any warranty or liability for your use of this information. Back Stretches: Exercises  Your Care Instructions  Here are some examples of exercises for stretching your back.  Start each exercise slowly. Ease off the exercise if you start to have pain. Your doctor or physical therapist will tell you when you can start these exercises and which ones will work best for you. How to do the exercises  Overhead stretch    1. Stand comfortably with your feet shoulder-width apart. 2. Looking straight ahead, raise both arms over your head and reach toward the ceiling. Do not allow your head to tilt back. 3. Hold for 15 to 30 seconds, then lower your arms to your sides. 4. Repeat 2 to 4 times. Side stretch    1. Stand comfortably with your feet shoulder-width apart. 2. Raise one arm over your head, and then lean to the other side. 3. Slide your hand down your leg as you let the weight of your arm gently stretch your side muscles. Hold for 15 to 30 seconds. 4. Repeat 2 to 4 times on each side. Press-up    1. Lie on your stomach, supporting your body with your forearms. 2. Press your elbows down into the floor to raise your upper back. As you do this, relax your stomach muscles and allow your back to arch without using your back muscles. As your press up, do not let your hips or pelvis come off the floor. 3. Hold for 15 to 30 seconds, then relax. 4. Repeat 2 to 4 times. Relax and rest    1. Lie on your back with a rolled towel under your neck and a pillow under your knees. Extend your arms comfortably to your sides. 2. Relax and breathe normally. 3. Remain in this position for about 10 minutes. 4. If you can, do this 2 or 3 times each day. Follow-up care is a key part of your treatment and safety. Be sure to make and go to all appointments, and call your doctor if you are having problems. It's also a good idea to know your test results and keep a list of the medicines you take. Where can you learn more? Go to http://sunshine-kandi.info/. Enter P985 in the search box to learn more about \"Back Stretches: Exercises. \"  Current as of: March 21, 2017  Content Version: 11.3  © 5377-1756 [employer]. Care instructions adapted under license by [employer] (which disclaims liability or warranty for this information). If you have questions about a medical condition or this instruction, always ask your healthcare professional. [address] any warranty or liability for your use of this information. Chronic Pain: Care Instructions  Your Care Instructions  Chronic pain is pain that lasts a long time (months or even years) and may or may not have a clear cause. It is different from acute pain, which usually does have a clear cause--like an injury or illness--and gets better over time. Chronic pain:  · Lasts over time but may vary from day to day. · Does not go away despite efforts to end it. · May disrupt your sleep and lead to fatigue. · May cause depression or anxiety. · May make your muscles tense, causing more pain. · Can disrupt your work, hobbies, home life, and relationships with friends and family. Chronic pain is a very real condition. It is not just in your head. Treatment can help and usually includes several methods used together, such as medicines, physical therapy, exercise, and other treatments. Learning how to relax and changing negative thought patterns can also help you cope. Chronic pain is complex. Taking an active role in your treatment will help you better manage your pain. Tell your doctor if you have trouble dealing with your pain. You may have to try several things before you find what works best for you. Follow-up care is a key part of your treatment and safety. Be sure to make and go to all appointments, and call your doctor if you are having problems. Its also a good idea to know your test results and keep a list of the medicines you take. How can you care for yourself at home? · Pace yourself. Break up large jobs into smaller tasks.  Save harder tasks for days when you have less pain, or go back and forth between hard tasks and easier ones. Take rest breaks. · Relax, and reduce stress. Relaxation techniques such as deep breathing or meditation can help. · Keep moving. Gentle, daily exercise can help reduce pain over the long run. Try low- or no-impact exercises such as walking, swimming, and stationary biking. Do stretches to stay flexible. · Try heat, cold packs, and massage. · Get enough sleep. Chronic pain can make you tired and drain your energy. Talk with your doctor if you have trouble sleeping because of pain. · Think positive. Your thoughts can affect your pain level. Do things that you enjoy to distract yourself when you have pain instead of focusing on the pain. See a movie, read a book, listen to music, or spend time with a friend. · If you think you are depressed, talk to your doctor about treatment. · Keep a daily pain diary. Record how your moods, thoughts, sleep patterns, activities, and medicine affect your pain. You may find that your pain is worse during or after certain activities or when you are feeling a certain emotion. Having a record of your pain can help you and your doctor find the best ways to treat your pain. · Take pain medicines exactly as directed. ¨ If the doctor gave you a prescription medicine for pain, take it as prescribed. ¨ If you are not taking a prescription pain medicine, ask your doctor if you can take an over-the-counter medicine. Reducing constipation caused by pain medicine  · Include fruits, vegetables, beans, and whole grains in your diet each day. These foods are high in fiber. · Drink plenty of fluids, enough so that your urine is light yellow or clear like water. If you have kidney, heart, or liver disease and have to limit fluids, talk with your doctor before you increase the amount of fluids you drink. · If your doctor recommends it, get more exercise. Walking is a good choice. Bit by bit, increase the amount you walk every day.  Try for at least 30 minutes on most days of the week. · Schedule time each day for a bowel movement. A daily routine may help. Take your time and do not strain when having a bowel movement. When should you call for help? Call your doctor now or seek immediate medical care if:  · Your pain gets worse or is out of control. · You feel down or blue, or you do not enjoy things like you once did. You may be depressed, which is common in people with chronic pain. Depression can be treated. · You have vomiting or cramps for more than 2 hours. Watch closely for changes in your health, and be sure to contact your doctor if:  · You cannot sleep because of pain. · You are very worried or anxious about your pain. · You have trouble taking your pain medicine. · You have any concerns about your pain medicine. · You have trouble with bowel movements, such as:  ¨ No bowel movement in 3 days. ¨ Blood in the anal area, in your stool, or on the toilet paper. ¨ Diarrhea for more than 24 hours. Where can you learn more? Go to http://sunshine-kandi.info/. Enter N004 in the search box to learn more about \"Chronic Pain: Care Instructions. \"  Current as of: October 14, 2016  Content Version: 11.3  © 5502-9338 [employer]. Care instructions adapted under license by [employer] (which disclaims liability or warranty for this information). If you have questions about a medical condition or this instruction, always ask your healthcare professional. [place] [address] any warranty or liability for your use of this information.

## 2017-10-17 ENCOUNTER — PATIENT MESSAGE (OUTPATIENT)
Dept: INTERNAL MEDICINE CLINIC | Facility: CLINIC | Age: 52
End: 2017-10-17

## 2017-10-17 DIAGNOSIS — M54.42 CHRONIC BILATERAL LOW BACK PAIN WITH LEFT-SIDED SCIATICA: Primary | ICD-10-CM

## 2017-10-17 DIAGNOSIS — G89.29 CHRONIC BILATERAL LOW BACK PAIN WITH LEFT-SIDED SCIATICA: Primary | ICD-10-CM

## 2017-10-17 NOTE — TELEPHONE ENCOUNTER
From: Luis Francisco  To: Mark Mackay PA-C  Sent: 10/17/2017 7:49 AM EDT  Subject: Update Medical Information    Good morning. Went to ER yesterday in major pain. Leg went out, excrutiating pain in lower back, left and right sides at hip (where you bend). I was given a shot of dilaudid and prescriptions for a lidocain patch (insurance would not cover this), oxycodone and prednisone dose pack. The shot never worked, just made me loopy. By 10:00 last night it had worn off altogether. Today, I can barely walk (the back pain is radiating down my leg). I have made the ortho appt for Nov 21 and sent you the info for the new referral. The MRI has not been scheduled yet. Any suggestions on what I can do in the meantime? I'm on a LOT of medications that don't seem to be working. ..

## 2017-10-23 ENCOUNTER — TELEPHONE (OUTPATIENT)
Dept: INTERNAL MEDICINE CLINIC | Facility: CLINIC | Age: 52
End: 2017-10-23

## 2017-10-23 NOTE — TELEPHONE ENCOUNTER
Patient explained that she has a MRI scheduled for tomorrow that's awaiting approval. The authorization personnel has the order but still haven't started processed through the insurance company. The scheduling department suggested to the patient that she contact Chang Jasso to have her contact them directly, to expedite things. Please advise!     Pre-authorization department: [phone]

## 2017-10-23 NOTE — TELEPHONE ENCOUNTER
Spoke with Idris Díaz () - she will contact  to get update on MRI situation and will contact patient to make f/u appointment.

## 2017-10-24 ENCOUNTER — OFFICE VISIT (OUTPATIENT)
Dept: INTERNAL MEDICINE CLINIC | Facility: CLINIC | Age: 52
End: 2017-10-24

## 2017-10-24 VITALS
HEIGHT: 67 IN | WEIGHT: 214.6 LBS | OXYGEN SATURATION: 98 % | DIASTOLIC BLOOD PRESSURE: 70 MMHG | HEART RATE: 68 BPM | TEMPERATURE: 97.7 F | BODY MASS INDEX: 33.68 KG/M2 | RESPIRATION RATE: 18 BRPM | SYSTOLIC BLOOD PRESSURE: 113 MMHG

## 2017-10-24 DIAGNOSIS — M54.42 CHRONIC BILATERAL LOW BACK PAIN WITH LEFT-SIDED SCIATICA: ICD-10-CM

## 2017-10-24 DIAGNOSIS — Z98.890 HISTORY OF LUMBAR LAMINECTOMY: ICD-10-CM

## 2017-10-24 DIAGNOSIS — G89.29 CHRONIC BILATERAL LOW BACK PAIN WITH LEFT-SIDED SCIATICA: ICD-10-CM

## 2017-10-24 DIAGNOSIS — M79.7 FIBROMYALGIA: Primary | ICD-10-CM

## 2017-10-24 RX ORDER — AMITRIPTYLINE HYDROCHLORIDE 100 MG/1
100 TABLET, FILM COATED ORAL
Qty: 30 TAB | Refills: 2 | Status: SHIPPED | OUTPATIENT
Start: 2017-10-24 | End: 2017-12-24 | Stop reason: SDUPTHER

## 2017-10-24 NOTE — PROGRESS NOTES
HISTORY OF PRESENT ILLNESS  Lopez Pichardo is a 47 y.o. female. Chief Complaint   Patient presents with    Back Pain     Patient states she has pain all over 10/10. Room 8     HPI  1) Hx Fibromyalgia and Low back pain currently radiating down B legs. Pain in B arms. Pain in muscles and in joints. Having muscle spasms in back and sharp shooting pains at unpredictable intervals throughout body. Went to ER and IM Dilaudid was not helpful. Pt notes that in the past, Gabapentin was not helpful for Fibromyalgia. Recently Prednisone (oral) was not helpful. Review of Systems   Constitutional: Positive for malaise/fatigue. Gastrointestinal: Negative for diarrhea. Genitourinary: Negative for dysuria, flank pain, frequency, hematuria and urgency. Musculoskeletal: Positive for back pain, joint pain and myalgias. Psychiatric/Behavioral: Positive for depression. The patient is nervous/anxious. Physical Exam   Constitutional: She is oriented to person, place, and time. She appears well-developed and well-nourished. No distress. Unexpectedly \"jumps\" in seat once during history noting sharp pain in her back, and is then tearful. HENT:   Head: Normocephalic and atraumatic. Neck: Neck supple. No JVD present. Cardiovascular: Normal rate, regular rhythm and normal heart sounds. Pulmonary/Chest: Effort normal and breath sounds normal. No respiratory distress. Musculoskeletal: She exhibits no edema. Spasm noted throughout back. Neurological: She is alert and oriented to person, place, and time. Skin: Skin is warm and dry. Psychiatric: Her behavior is normal. Judgment and thought content normal.   Tearful at times during history. Nursing note and vitals reviewed. ASSESSMENT and PLAN    ICD-10-CM ICD-9-CM    1. Fibromyalgia M79.7 729.1 Start amitriptyline (ELAVIL) 100 mg tablet      NICK BY MULTIPLEX FLOW IA, QL      RHEUMATOID FACTOR      SED RATE (ESR)   2.  History of lumbar laminectomy [id number] V45.89 Encouraged pt to move her Ortho apt up asap.     3. Chronic bilateral low back pain with left-sided sciatica C84.67 736.6 METABOLIC PANEL, COMPREHENSIVE    G89.29 724.3 Start amitriptyline (ELAVIL) 100 mg tablet     338.29

## 2017-10-24 NOTE — PROGRESS NOTES
Chief Complaint   Patient presents with    Back Pain     Patient states she has pain all over 10/10. Room 8     1. Have you been to the ER, urgent care clinic since your last visit? Hospitalized since your last visit? No    2. Have you seen or consulted any other health care providers outside of the [address] [place] [address] since your last visit? Include any pap smears or colon screening.  No     Health Maintenance Due   Topic Date Due    Pneumococcal 19-64 Medium Risk (1 of 1 - PPSV23) 09/16/1985    FOBT Q 1 YEAR AGE 50-75  09/16/2016    INFLUENZA AGE 9 TO ADULT  08/01/2017

## 2017-10-25 DIAGNOSIS — L30.9 ECZEMA, UNSPECIFIED TYPE: ICD-10-CM

## 2017-10-25 LAB
ALBUMIN SERPL-MCNC: 4.3 G/DL (ref 3.5–5.5)
ALBUMIN/GLOB SERPL: 1.8 {RATIO} (ref 1.2–2.2)
ALP SERPL-CCNC: 43 IU/L (ref 39–117)
ALT SERPL-CCNC: 22 IU/L (ref 0–32)
ANA SER QL: NEGATIVE
AST SERPL-CCNC: 50 IU/L (ref 0–40)
BILIRUB SERPL-MCNC: 0.6 MG/DL (ref 0–1.2)
BUN SERPL-MCNC: 17 MG/DL (ref 6–24)
BUN/CREAT SERPL: 19 (ref 9–23)
CALCIUM SERPL-MCNC: 9.4 MG/DL (ref 8.7–10.2)
CHLORIDE SERPL-SCNC: 101 MMOL/L (ref 96–106)
CO2 SERPL-SCNC: 26 MMOL/L (ref 18–29)
CREAT SERPL-MCNC: 0.91 MG/DL (ref 0.57–1)
ERYTHROCYTE [SEDIMENTATION RATE] IN BLOOD BY WESTERGREN METHOD: 2 MM/HR (ref 0–40)
GFR SERPLBLD CREATININE-BSD FMLA CKD-EPI: 73 ML/MIN/1.73
GFR SERPLBLD CREATININE-BSD FMLA CKD-EPI: 84 ML/MIN/1.73
GLOBULIN SER CALC-MCNC: 2.4 G/DL (ref 1.5–4.5)
GLUCOSE SERPL-MCNC: 80 MG/DL (ref 65–99)
POTASSIUM SERPL-SCNC: 5 MMOL/L (ref 3.5–5.2)
PROT SERPL-MCNC: 6.7 G/DL (ref 6–8.5)
RHEUMATOID FACT SERPL-ACNC: <10 IU/ML (ref 0–13.9)
SODIUM SERPL-SCNC: 141 MMOL/L (ref 134–144)

## 2017-10-26 PROBLEM — R74.8 ELEVATED LIVER ENZYMES: Status: ACTIVE | Noted: 2017-10-26

## 2017-10-26 RX ORDER — FLUOCINONIDE 0.5 MG/G
CREAM TOPICAL
Qty: 30 G | Refills: 0 | Status: SHIPPED | OUTPATIENT
Start: 2017-10-26 | End: 2017-11-28 | Stop reason: SDUPTHER

## 2017-10-26 NOTE — PROGRESS NOTES
Catarina Zavala,   One of your liver enzymes is slightly elevated - probably from all the pain medication/steroids lately. We should recheck this in 1-3 months, but it is not at an especially concerning level. Your protein level and rheumatology labs are normal. Good!    Gabbi Carlisle

## 2017-10-27 ENCOUNTER — TELEPHONE (OUTPATIENT)
Dept: INTERNAL MEDICINE CLINIC | Facility: CLINIC | Age: 52
End: 2017-10-27

## 2017-10-27 DIAGNOSIS — G89.29 CHRONIC BILATERAL LOW BACK PAIN WITH LEFT-SIDED SCIATICA: Primary | ICD-10-CM

## 2017-10-27 DIAGNOSIS — M54.42 CHRONIC BILATERAL LOW BACK PAIN WITH LEFT-SIDED SCIATICA: Primary | ICD-10-CM

## 2017-10-27 NOTE — TELEPHONE ENCOUNTER
MRI denied because of failure to meet criteria for approval:   1) Had X-ray failing to show source of problem  2) Failed to improve following a recent (within 3 months) 6 week trial of doctor prescribed treatment and/or observation  3) Follow up contact with doctor to look at progress after 6 weeks. Will order x-ray lumbar spine and PT.

## 2017-10-30 ENCOUNTER — PATIENT MESSAGE (OUTPATIENT)
Dept: INTERNAL MEDICINE CLINIC | Facility: CLINIC | Age: 52
End: 2017-10-30

## 2017-10-30 DIAGNOSIS — G89.29 CHRONIC BILATERAL LOW BACK PAIN WITH LEFT-SIDED SCIATICA: ICD-10-CM

## 2017-10-30 DIAGNOSIS — M54.42 CHRONIC BILATERAL LOW BACK PAIN WITH LEFT-SIDED SCIATICA: ICD-10-CM

## 2017-10-30 DIAGNOSIS — Z98.890 HISTORY OF LUMBAR LAMINECTOMY: Primary | ICD-10-CM

## 2017-10-30 NOTE — TELEPHONE ENCOUNTER
From: Jesse Gallardo  To: Darya Vanegas PA-C  Sent: 10/30/2017 10:44 AM EDT  Subject: Referral Request    Good morning. I have an appt with Ruby Perdue on Friday, Nov 3 at 10:00. The only spine/back specialist in that office is Orestes Guerrero, and he is booked thru next Aug. If Ms. Melany England feels it's an emergency, then they will make sure I get in to see him.  If not, [facility] will treat me, pain management, therapy, etc.     [id number], [address] [place] [address], [address] [place] [address]  Phone: [phone]  Fax: [phone]

## 2017-11-28 DIAGNOSIS — M54.42 CHRONIC BILATERAL LOW BACK PAIN WITH LEFT-SIDED SCIATICA: ICD-10-CM

## 2017-11-28 DIAGNOSIS — G89.29 CHRONIC BILATERAL LOW BACK PAIN WITH LEFT-SIDED SCIATICA: ICD-10-CM

## 2017-11-28 DIAGNOSIS — I10 ESSENTIAL HYPERTENSION: ICD-10-CM

## 2017-11-28 DIAGNOSIS — L30.9 ECZEMA, UNSPECIFIED TYPE: ICD-10-CM

## 2017-11-28 RX ORDER — IBUPROFEN 800 MG/1
800 TABLET ORAL
Qty: 60 TAB | Refills: 5 | Status: SHIPPED | OUTPATIENT
Start: 2017-11-28 | End: 2017-12-24 | Stop reason: SDUPTHER

## 2017-11-28 RX ORDER — FLUOCINONIDE 0.5 MG/G
CREAM TOPICAL 2 TIMES DAILY
Qty: 30 G | Refills: 1 | Status: SHIPPED | OUTPATIENT
Start: 2017-11-28 | End: 2017-12-24 | Stop reason: SDUPTHER

## 2017-11-28 RX ORDER — HYDROCHLOROTHIAZIDE 25 MG/1
25 TABLET ORAL DAILY
Qty: 30 TAB | Refills: 5 | Status: SHIPPED | OUTPATIENT
Start: 2017-11-28 | End: 2017-12-24 | Stop reason: SDUPTHER

## 2017-11-28 NOTE — TELEPHONE ENCOUNTER
From: Keith Shaver  To: Amanda Sinha PA-C  Sent: 11/28/2017 7:20 AM EST  Subject: Medication Renewal Request    Original authorizing provider: LATHA Brown would like a refill of the following medications:  ibuprofen (MOTRIN) 800 mg tablet Amanda Sinha PA-C]  fluocinoNIDE (LIDEX) 0.05 % topical cream Amanda Sinha PA-C]    Preferred pharmacy: [facility] [address]    Comment:      Medication renewals requested in this message routed to other providers:  hydroCHLOROthiazide (HYDRODIURIL) 25 mg tablet [Katherine E Skiff, NP]

## 2017-11-28 NOTE — TELEPHONE ENCOUNTER
From: Josemanuel Odell  To: Talia Zapata NP  Sent: 11/28/2017 7:20 AM EST  Subject: Medication Renewal Request    Original authorizing provider: OBEY Adrian would like a refill of the following medications:  hydroCHLOROthiazide (HYDRODIURIL) 25 mg tablet [Katherine E Skiff, NP]    Preferred pharmacy: [address], [address] [place] [address]    Comment:      Medication renewals requested in this message routed to other providers:  ibuprofen (MOTRIN) 800 mg tablet Nato Blanton PA-C]  fluocinoNIDE (LIDEX) 0.05 % topical cream Nato Blanton PA-C]

## 2017-12-24 ENCOUNTER — HOSPITAL ENCOUNTER (OUTPATIENT)
Dept: MRI IMAGING | Age: 52
Discharge: HOME | End: 2017-12-24
Attending: SPECIALIST
Payer: COMMERCIAL

## 2017-12-24 VITALS — WEIGHT: 210 LBS | BODY MASS INDEX: 33.39 KG/M2

## 2017-12-24 DIAGNOSIS — G89.29 CHRONIC BILATERAL LOW BACK PAIN WITH LEFT-SIDED SCIATICA: ICD-10-CM

## 2017-12-24 DIAGNOSIS — M54.42 CHRONIC BILATERAL LOW BACK PAIN WITH LEFT-SIDED SCIATICA: ICD-10-CM

## 2017-12-24 DIAGNOSIS — M79.7 FIBROMYALGIA: ICD-10-CM

## 2017-12-24 DIAGNOSIS — L30.9 ECZEMA, UNSPECIFIED TYPE: ICD-10-CM

## 2017-12-24 DIAGNOSIS — M54.16 LUMBAR RADICULOPATHY: ICD-10-CM

## 2017-12-24 DIAGNOSIS — I10 ESSENTIAL HYPERTENSION: ICD-10-CM

## 2017-12-24 PROCEDURE — 74011250636 HC RX REV CODE- 250/636: Performed by: SPECIALIST

## 2017-12-24 PROCEDURE — A9576 INJ PROHANCE MULTIPACK: HCPCS | Performed by: SPECIALIST

## 2017-12-24 PROCEDURE — 72158 MRI LUMBAR SPINE W/O & W/DYE: CPT

## 2017-12-24 RX ADMIN — GADOTERIDOL 19 ML: 279.3 INJECTION, SOLUTION INTRAVENOUS at 09:00

## 2017-12-26 RX ORDER — FLUOCINONIDE 0.5 MG/G
CREAM TOPICAL 2 TIMES DAILY
Qty: 30 G | Refills: 1 | Status: SHIPPED | OUTPATIENT
Start: 2017-12-26

## 2017-12-26 RX ORDER — AMLODIPINE BESYLATE 10 MG/1
10 TABLET ORAL DAILY
Qty: 30 TAB | Refills: 5 | Status: SHIPPED | OUTPATIENT
Start: 2017-12-26

## 2017-12-26 RX ORDER — IBUPROFEN 800 MG/1
800 TABLET ORAL
Qty: 60 TAB | Refills: 5 | Status: SHIPPED | OUTPATIENT
Start: 2017-12-26 | End: 2022-05-04

## 2017-12-26 RX ORDER — CETIRIZINE HCL 10 MG
10 TABLET ORAL
Qty: 30 TAB | Refills: 11 | Status: SHIPPED | OUTPATIENT
Start: 2017-12-26 | End: 2022-05-04

## 2017-12-26 RX ORDER — AMITRIPTYLINE HYDROCHLORIDE 100 MG/1
100 TABLET, FILM COATED ORAL
Qty: 30 TAB | Refills: 2 | Status: SHIPPED | OUTPATIENT
Start: 2017-12-26 | End: 2022-05-04

## 2017-12-26 RX ORDER — HYDROCHLOROTHIAZIDE 25 MG/1
25 TABLET ORAL DAILY
Qty: 30 TAB | Refills: 5 | Status: SHIPPED | OUTPATIENT
Start: 2017-12-26 | End: 2022-05-04

## 2017-12-26 NOTE — TELEPHONE ENCOUNTER
From: Maurilio Sparrow  To: Sharad Buchanan PA-C  Sent: 12/24/2017 1:14 PM EST  Subject: Medication Renewal Request    Original authorizing provider: Sherwin Garibaldi, PA-C Brent Kussmaul Melva Situ would like a refill of the following medications:  cetirizine (ZYRTEC) 10 mg tablet Sharad Buchanan PA-C]  amLODIPine (NORVASC) 10 mg tablet Sharad Buchanan PA-C]  amitriptyline (ELAVIL) 100 mg tablet Sharad Buchanan PA-C]  hydroCHLOROthiazide (HYDRODIURIL) 25 mg tablet Sharad Buchanan PA-C]  ibuprofen (MOTRIN) 800 mg tablet Sharad Buchanan PA-C]  fluocinoNIDE (LIDEX) 0.05 % topical cream Sharad Buchanan PA-C]    Preferred pharmacy: [address], [address] [facility]    Comment:

## 2021-08-13 ENCOUNTER — TRANSCRIBE ORDER (OUTPATIENT)
Dept: REGISTRATION | Age: 56
End: 2021-08-13

## 2021-08-13 ENCOUNTER — HOSPITAL ENCOUNTER (OUTPATIENT)
Dept: GENERAL RADIOLOGY | Age: 56
Discharge: HOME | End: 2021-08-13
Payer: COMMERCIAL

## 2021-08-13 DIAGNOSIS — R06.02 SOB (SHORTNESS OF BREATH): ICD-10-CM

## 2021-08-13 DIAGNOSIS — R06.02 SOB (SHORTNESS OF BREATH): Primary | ICD-10-CM

## 2021-08-13 PROCEDURE — 71046 X-RAY EXAM CHEST 2 VIEWS: CPT

## 2022-03-19 PROBLEM — M54.42 CHRONIC BILATERAL LOW BACK PAIN WITH LEFT-SIDED SCIATICA: Status: ACTIVE | Noted: 2017-04-19

## 2022-03-19 PROBLEM — R74.8 ELEVATED LIVER ENZYMES: Status: ACTIVE | Noted: 2017-10-26

## 2022-03-19 PROBLEM — T78.08XA: Status: ACTIVE | Noted: 2017-03-01

## 2022-03-19 PROBLEM — J45.990 EXERCISE-INDUCED ASTHMA: Status: ACTIVE | Noted: 2017-03-01

## 2022-03-19 PROBLEM — G89.29 CHRONIC BILATERAL LOW BACK PAIN WITH LEFT-SIDED SCIATICA: Status: ACTIVE | Noted: 2017-04-19

## 2022-03-20 PROBLEM — I10 ESSENTIAL HYPERTENSION: Status: ACTIVE | Noted: 2017-04-19

## 2022-05-04 ENCOUNTER — HOSPITAL ENCOUNTER (EMERGENCY)
Age: 57
Discharge: HOME | End: 2022-05-04
Attending: EMERGENCY MEDICINE
Payer: COMMERCIAL

## 2022-05-04 VITALS
HEIGHT: 66 IN | OXYGEN SATURATION: 100 % | TEMPERATURE: 98.8 F | RESPIRATION RATE: 18 BRPM | WEIGHT: 220.02 LBS | DIASTOLIC BLOOD PRESSURE: 75 MMHG | SYSTOLIC BLOOD PRESSURE: 112 MMHG | BODY MASS INDEX: 35.36 KG/M2 | HEART RATE: 62 BPM

## 2022-05-04 DIAGNOSIS — R42 LIGHTHEADEDNESS: Primary | ICD-10-CM

## 2022-05-04 DIAGNOSIS — M79.7 FIBROMYALGIA: ICD-10-CM

## 2022-05-04 DIAGNOSIS — R55 SYNCOPE, VASOVAGAL: ICD-10-CM

## 2022-05-04 LAB
ALBUMIN SERPL-MCNC: 3.5 G/DL (ref 3.5–5)
ALBUMIN/GLOB SERPL: 1.2 {RATIO} (ref 1.1–2.2)
ALP SERPL-CCNC: 44 U/L (ref 45–117)
ALT SERPL-CCNC: 24 U/L (ref 12–78)
ANION GAP SERPL CALC-SCNC: 3 MMOL/L (ref 5–15)
APPEARANCE UR: CLEAR
AST SERPL-CCNC: 18 U/L (ref 15–37)
ATRIAL RATE: 58 BPM
BACTERIA URNS QL MICRO: NEGATIVE /HPF
BASOPHILS # BLD: 0 K/UL (ref 0–0.1)
BASOPHILS NFR BLD: 0 % (ref 0–1)
BILIRUB SERPL-MCNC: 0.4 MG/DL (ref 0.2–1)
BILIRUB UR QL: NEGATIVE
BUN SERPL-MCNC: 19 MG/DL (ref 6–20)
BUN/CREAT SERPL: 18 (ref 12–20)
CALCIUM SERPL-MCNC: 9.1 MG/DL (ref 8.5–10.1)
CALCULATED P AXIS, ECG09: 69 DEGREES
CALCULATED R AXIS, ECG10: 58 DEGREES
CALCULATED T AXIS, ECG11: 34 DEGREES
CHLORIDE SERPL-SCNC: 107 MMOL/L (ref 97–108)
CO2 SERPL-SCNC: 28 MMOL/L (ref 21–32)
COLOR UR: ABNORMAL
CREAT SERPL-MCNC: 1.03 MG/DL (ref 0.55–1.02)
DIAGNOSIS, 93000: NORMAL
DIFFERENTIAL METHOD BLD: ABNORMAL
EOSINOPHIL # BLD: 0 K/UL (ref 0–0.4)
EOSINOPHIL NFR BLD: 0 % (ref 0–7)
EPITH CASTS URNS QL MICRO: ABNORMAL /LPF
ERYTHROCYTE [DISTWIDTH] IN BLOOD BY AUTOMATED COUNT: 15.3 % (ref 11.5–14.5)
GLOBULIN SER CALC-MCNC: 2.9 G/DL (ref 2–4)
GLUCOSE SERPL-MCNC: 142 MG/DL (ref 65–100)
GLUCOSE UR STRIP.AUTO-MCNC: NEGATIVE MG/DL
HCT VFR BLD AUTO: 47.5 % (ref 35–47)
HGB BLD-MCNC: 15.4 G/DL (ref 11.5–16)
HGB UR QL STRIP: NEGATIVE
HYALINE CASTS URNS QL MICRO: ABNORMAL /LPF (ref 0–2)
IMM GRANULOCYTES # BLD AUTO: 0 K/UL (ref 0–0.04)
IMM GRANULOCYTES NFR BLD AUTO: 0 % (ref 0–0.5)
KETONES UR QL STRIP.AUTO: NEGATIVE MG/DL
LEUKOCYTE ESTERASE UR QL STRIP.AUTO: NEGATIVE
LIPASE SERPL-CCNC: 61 U/L (ref 73–393)
LYMPHOCYTES # BLD: 1.6 K/UL (ref 0.8–3.5)
LYMPHOCYTES NFR BLD: 16 % (ref 12–49)
MCH RBC QN AUTO: 29.4 PG (ref 26–34)
MCHC RBC AUTO-ENTMCNC: 32.4 G/DL (ref 30–36.5)
MCV RBC AUTO: 90.8 FL (ref 80–99)
MONOCYTES # BLD: 0.3 K/UL (ref 0–1)
MONOCYTES NFR BLD: 3 % (ref 5–13)
NEUTS SEG # BLD: 7.5 K/UL (ref 1.8–8)
NEUTS SEG NFR BLD: 81 % (ref 32–75)
NITRITE UR QL STRIP.AUTO: NEGATIVE
NRBC # BLD: 0 K/UL (ref 0–0.01)
NRBC BLD-RTO: 0 PER 100 WBC
P-R INTERVAL, ECG05: 160 MS
PH UR STRIP: 6.5 [PH] (ref 5–8)
PLATELET # BLD AUTO: 230 K/UL (ref 150–400)
PMV BLD AUTO: 9.2 FL (ref 8.9–12.9)
POTASSIUM SERPL-SCNC: 4.7 MMOL/L (ref 3.5–5.1)
PROT SERPL-MCNC: 6.4 G/DL (ref 6.4–8.2)
PROT UR STRIP-MCNC: NEGATIVE MG/DL
Q-T INTERVAL, ECG07: 456 MS
QRS DURATION, ECG06: 76 MS
QTC CALCULATION (BEZET), ECG08: 447 MS
RBC # BLD AUTO: 5.23 M/UL (ref 3.8–5.2)
RBC #/AREA URNS HPF: ABNORMAL /HPF (ref 0–5)
SODIUM SERPL-SCNC: 138 MMOL/L (ref 136–145)
SP GR UR REFRACTOMETRY: 1.02
UA: UC IF INDICATED,UAUC: ABNORMAL
UROBILINOGEN UR QL STRIP.AUTO: 1 EU/DL (ref 0.2–1)
VENTRICULAR RATE, ECG03: 58 BPM
WBC # BLD AUTO: 9.5 K/UL (ref 3.6–11)
WBC URNS QL MICRO: ABNORMAL /HPF (ref 0–4)

## 2022-05-04 PROCEDURE — 81001 URINALYSIS AUTO W/SCOPE: CPT

## 2022-05-04 PROCEDURE — 80053 COMPREHEN METABOLIC PANEL: CPT

## 2022-05-04 PROCEDURE — 83690 ASSAY OF LIPASE: CPT

## 2022-05-04 PROCEDURE — 74011250636 HC RX REV CODE- 250/636: Performed by: EMERGENCY MEDICINE

## 2022-05-04 PROCEDURE — 85025 COMPLETE CBC W/AUTO DIFF WBC: CPT

## 2022-05-04 PROCEDURE — 99284 EMERGENCY DEPT VISIT MOD MDM: CPT

## 2022-05-04 PROCEDURE — 93005 ELECTROCARDIOGRAM TRACING: CPT

## 2022-05-04 PROCEDURE — 36415 COLL VENOUS BLD VENIPUNCTURE: CPT

## 2022-05-04 PROCEDURE — 74011250637 HC RX REV CODE- 250/637: Performed by: EMERGENCY MEDICINE

## 2022-05-04 PROCEDURE — 96372 THER/PROPH/DIAG INJ SC/IM: CPT

## 2022-05-04 RX ORDER — DIAZEPAM 5 MG/1
5 TABLET ORAL
Status: COMPLETED | OUTPATIENT
Start: 2022-05-04 | End: 2022-05-04

## 2022-05-04 RX ORDER — KETOROLAC TROMETHAMINE 30 MG/ML
30 INJECTION, SOLUTION INTRAMUSCULAR; INTRAVENOUS
Status: COMPLETED | OUTPATIENT
Start: 2022-05-04 | End: 2022-05-04

## 2022-05-04 RX ORDER — PROCHLORPERAZINE MALEATE 10 MG
10 TABLET ORAL
Qty: 20 TABLET | Refills: 0 | Status: SHIPPED | OUTPATIENT
Start: 2022-05-04 | End: 2022-05-11

## 2022-05-04 RX ORDER — LOSARTAN POTASSIUM 25 MG/1
25 TABLET ORAL DAILY
COMMUNITY

## 2022-05-04 RX ORDER — ESCITALOPRAM OXALATE 10 MG/1
10 TABLET ORAL DAILY
COMMUNITY

## 2022-05-04 RX ORDER — BUSPIRONE HYDROCHLORIDE 10 MG/1
TABLET ORAL 3 TIMES DAILY
COMMUNITY

## 2022-05-04 RX ORDER — PROCHLORPERAZINE MALEATE 10 MG
10 TABLET ORAL
Status: COMPLETED | OUTPATIENT
Start: 2022-05-04 | End: 2022-05-04

## 2022-05-04 RX ADMIN — KETOROLAC TROMETHAMINE 30 MG: 30 INJECTION, SOLUTION INTRAMUSCULAR at 13:01

## 2022-05-04 RX ADMIN — PROCHLORPERAZINE MALEATE 10 MG: 10 TABLET ORAL at 13:14

## 2022-05-04 RX ADMIN — DIAZEPAM 5 MG: 5 TABLET ORAL at 12:59

## 2022-05-04 NOTE — ED NOTES
Pt presents to ED with reports of feeling dizzy, possible syncopal episode this morning while waiting to donate plasma, body aches, hx of vertigo, fibromyalgia. Pt states this dizziness does not feel like vertigo. Pt also reports some intermittent nausea, one episode of vomiting.

## 2022-05-04 NOTE — DISCHARGE INSTRUCTIONS
Your physical examination and vital signs and laboratories are all very reassuring today. Continue taking all of your medications as prescribed, and you can use Compazine as needed for nausea and vomiting. Make sure that you are taking an adequate dose of Lexapro. In the computer it suggests that you may be taking 10 mg a day. A more typical dose might be 20 to 40 mg daily. Talk to your doctor about any dose adjustments.

## 2022-05-04 NOTE — ED PROVIDER NOTES
EMERGENCY DEPARTMENT HISTORY AND PHYSICAL EXAM      Date: 2022  Patient Name: Jovani Chakraborty  Patient Age and Sex: 55 y.o. female  MRN:  [id number]  CSN:  [id number]    History of Presenting Illness     Chief Complaint   Patient presents with    Vomiting     N/V for 3 days, denies any abd pain    Syncope     Syncopal episode this am while waiting to donate plasma. Now feeling weak, dizziness when walking. History Provided By: Patient    Ability to gather history was limited by:     HPI: Jovani Chakraborty, 55 y.o. female with history of depression, fibromyalgia, complains of variety of vague symptoms for the past few days. She reports feeling lightheaded and woozy, had a momentary syncopal episode this morning while waiting to donate plasma. She has had intermittent nausea and one episode of vomiting. No chest pain or abdominal pain or headache. No cough or respiratory symptoms. She endorses some generalized body aches, but no fevers. Location:    Quality:      Severity:    Duration:   Timing:      Context:    Modifying factors:   Associated symptoms:     Past History      The patient's medical, surgical, and social history on file were reviewed by me today.  The family history was reviewed by me today and was non-contributory, unless otherwise specified below:    Past Medical History:  Past Medical History:   Diagnosis Date    Abdominal abscess     Contact dermatitis and other eczema, due to unspecified cause     Depression     Dyslipidemia     Headache(784.0)     History of mammography, screening 2013    Normal     Hypertension     Lumbar stenosis 2012    Pleurisy     Psychiatric disorder     DEPRESSION.     Scoliosis        Past Surgical History:  Past Surgical History:   Procedure Laterality Date    ENDOSCOPY, COLON, DIAGNOSTIC      HX COLONOSCOPY      HX ENDOSCOPY      HX GI      COLONOSCOPY X 1    HX GYN       , tubal ligation     HX GYN hysterectomy secondary to dysmenorrhea    HX ORTHOPAEDIC      ganglian cyst removed from right foot  &     MS ABDOMEN SURGERY PROC UNLISTED      abdominal abscess drainage       Family History:  Family History   Problem Relation Age of Onset    Diabetes Mother     Thyroid Disease Mother     Thyroid Disease Father     Hypertension Father     Asthma Sister     Mult Sclerosis Sister     Thyroid Disease Sister        Social History:  Social History     Tobacco Use    Smoking status: Former Smoker     Quit date: 2004     Years since quittin.6    Smokeless tobacco: Never Used   Substance Use Topics    Alcohol use: Yes     Alcohol/week: 0.0 standard drinks     Comment: 1 or 2 glass of wine a month     Drug use: No       Current Medications:  No current facility-administered medications on file prior to encounter. Current Outpatient Medications on File Prior to Encounter   Medication Sig Dispense Refill    losartan (COZAAR) 25 mg tablet Take 25 mg by mouth daily.  bupropion HCl (WELLBUTRIN PO) Take  by mouth.  busPIRone (BUSPAR) 10 mg tablet Take  by mouth three (3) times daily.  escitalopram oxalate (LEXAPRO) 10 mg tablet Take 10 mg by mouth daily.  amLODIPine (NORVASC) 10 mg tablet Take 1 Tab by mouth daily. 30 Tab 5    fluocinoNIDE (LIDEX) 0.05 % topical cream Apply  to affected area two (2) times a day. 30 g 1    EPINEPHrine (EPIPEN) 0.3 mg/0.3 mL injection 0.3 mL by IntraMUSCular route once as needed for up to 1 dose. 1 Syringe 1    [DISCONTINUED] cetirizine (ZYRTEC) 10 mg tablet Take 1 Tab by mouth nightly. 30 Tab 11    [DISCONTINUED] amitriptyline (ELAVIL) 100 mg tablet Take 1 Tab by mouth nightly. 30 Tab 2    [DISCONTINUED] hydroCHLOROthiazide (HYDRODIURIL) 25 mg tablet Take 1 Tab by mouth daily. 30 Tab 5    [DISCONTINUED] ibuprofen (MOTRIN) 800 mg tablet Take 1 Tab by mouth every six (6) hours as needed for Pain (take with food).  60 Tab 5    [DISCONTINUED] raNITIdine (ZANTAC) 150 mg tablet Take 1 Tab by mouth two (2) times a day. 60 Tab 0    [DISCONTINUED] mometasone-formoterol (DULERA) 200-5 mcg/actuation HFA inhaler Take 2 Puffs by inhalation two (2) times a day. 1 Inhaler 5    [DISCONTINUED] albuterol (PROVENTIL HFA, VENTOLIN HFA, PROAIR HFA) 90 mcg/actuation inhaler Take 1 Puff by inhalation every six (6) hours as needed for Wheezing. 1 Inhaler 5       Allergies: Allergies   Allergen Reactions    Egg Shortness of Breath and Nausea and Vomiting    Sulfa (Sulfonamide Antibiotics) Anaphylaxis, Shortness of Breath and Swelling    Broccoli Rash and Itching    Chicken Derived Nausea and Vomiting    Dairy Aid [Lactase] Swelling    Doxycycline Unknown (comments)    Gluten Swelling    Pravastatin Myalgia and Other (comments)     Blurry vision    Tetracycline Unknown (comments)    Wheat Nausea and Vomiting    Ace Inhibitors Cough     Review of Systems    A complete ROS was reviewed by me today and was negative, unless otherwise specified below:    Review of Systems   Constitutional: Positive for fatigue. Negative for fever. Respiratory: Negative for shortness of breath. Cardiovascular: Negative for chest pain. Gastrointestinal: Positive for nausea and vomiting. Negative for abdominal pain. Musculoskeletal: Positive for myalgias. Neurological: Positive for syncope and light-headedness. Negative for headaches. All other systems reviewed and are negative. Physical Exam   Vital Signs  Patient Vitals for the past 8 hrs:   Temp Pulse Resp BP SpO2   05/04/22 1236 -- 62 -- 112/75 --   05/04/22 1229 98.8 °F (37.1 °C) 75 18 115/85 100 %          Physical Exam  Vitals and nursing note reviewed. Constitutional:       General: She is not in acute distress. Appearance: Normal appearance. She is well-developed. She is not ill-appearing. HENT:      Head: Normocephalic and atraumatic.       Mouth/Throat:      Mouth: Mucous membranes are moist.   Eyes:      General:         Right eye: No discharge. Left eye: No discharge. Conjunctiva/sclera: Conjunctivae normal.   Cardiovascular:      Rate and Rhythm: Normal rate and regular rhythm. Heart sounds: Normal heart sounds. No murmur heard. Pulmonary:      Effort: Pulmonary effort is normal. No respiratory distress. Breath sounds: Normal breath sounds. No wheezing. Abdominal:      General: There is no distension. Palpations: Abdomen is soft. Tenderness: There is no abdominal tenderness. Musculoskeletal:         General: No deformity. Normal range of motion. Cervical back: Normal range of motion and neck supple. Skin:     General: Skin is warm and dry. Findings: No rash. Neurological:      General: No focal deficit present. Mental Status: She is alert and oriented to person, place, and time. Psychiatric:         Mood and Affect: Mood is depressed. Affect is blunt. Speech: Speech normal.         Behavior: Behavior is slowed. Cognition and Memory: Cognition normal.      Comments: Blunted, slowed, depressed affect         Diagnostic Study Results   Labs  Recent Results (from the past 24 hour(s))   CBC WITH AUTOMATED DIFF    Collection Time: 05/04/22 11:08 AM   Result Value Ref Range    WBC 9.5 3.6 - 11.0 K/uL    RBC 5.23 (H) 3.80 - 5.20 M/uL    HGB 15.4 11.5 - 16.0 g/dL    HCT 47.5 (H) 35.0 - 47.0 %    MCV 90.8 80.0 - 99.0 FL    MCH 29.4 26.0 - 34.0 PG    MCHC 32.4 30.0 - 36.5 g/dL    RDW 15.3 (H) 11.5 - 14.5 %    PLATELET 146 584 - 746 K/uL    MPV 9.2 8.9 - 12.9 FL    NRBC 0.0 0  WBC    ABSOLUTE NRBC 0.00 0.00 - 0.01 K/uL    NEUTROPHILS 81 (H) 32 - 75 %    LYMPHOCYTES 16 12 - 49 %    MONOCYTES 3 (L) 5 - 13 %    EOSINOPHILS 0 0 - 7 %    BASOPHILS 0 0 - 1 %    IMMATURE GRANULOCYTES 0 0.0 - 0.5 %    ABS. NEUTROPHILS 7.5 1.8 - 8.0 K/UL    ABS. LYMPHOCYTES 1.6 0.8 - 3.5 K/UL    ABS. MONOCYTES 0.3 0.0 - 1.0 K/UL    ABS. EOSINOPHILS 0.0 0.0 - 0.4 K/UL    ABS. BASOPHILS 0.0 0.0 - 0.1 K/UL    ABS. IMM. GRANS. 0.0 0.00 - 0.04 K/UL    DF AUTOMATED     METABOLIC PANEL, COMPREHENSIVE    Collection Time: 05/04/22 11:08 AM   Result Value Ref Range    Sodium 138 136 - 145 mmol/L    Potassium 4.7 3.5 - 5.1 mmol/L    Chloride 107 97 - 108 mmol/L    CO2 28 21 - 32 mmol/L    Anion gap 3 (L) 5 - 15 mmol/L    Glucose 142 (H) 65 - 100 mg/dL    BUN 19 6 - 20 MG/DL    Creatinine 1.03 (H) 0.55 - 1.02 MG/DL    BUN/Creatinine ratio 18 12 - 20      GFR est AA >60 >60 ml/min/1.73m2    GFR est non-AA 56 (L) >60 ml/min/1.73m2    Calcium 9.1 8.5 - 10.1 MG/DL    Bilirubin, total 0.4 0.2 - 1.0 MG/DL    ALT (SGPT) 24 12 - 78 U/L    AST (SGOT) 18 15 - 37 U/L    Alk.  phosphatase 44 (L) 45 - 117 U/L    Protein, total 6.4 6.4 - 8.2 g/dL    Albumin 3.5 3.5 - 5.0 g/dL    Globulin 2.9 2.0 - 4.0 g/dL    A-G Ratio 1.2 1.1 - 2.2     LIPASE    Collection Time: 05/04/22 11:08 AM   Result Value Ref Range    Lipase 61 (L) 73 - 393 U/L   URINALYSIS W/ REFLEX CULTURE    Collection Time: 05/04/22 11:08 AM    Specimen: Urine   Result Value Ref Range    Color YELLOW/STRAW      Appearance CLEAR CLEAR      Specific gravity 1.017      pH (UA) 6.5 5.0 - 8.0      Protein Negative NEG mg/dL    Glucose Negative NEG mg/dL    Ketone Negative NEG mg/dL    Bilirubin Negative NEG      Blood Negative NEG      Urobilinogen 1.0 0.2 - 1.0 EU/dL    Nitrites Negative NEG      Leukocyte Esterase Negative NEG      UA:UC IF INDICATED CULTURE NOT INDICATED BY UA RESULT      WBC 0-4 0 - 4 /hpf    RBC 0-5 0 - 5 /hpf    Epithelial cells MODERATE (A) FEW /lpf    Bacteria Negative NEG /hpf    Hyaline cast 0-2 0 - 2 /lpf   EKG, 12 LEAD, INITIAL    Collection Time: 05/04/22  1:09 PM   Result Value Ref Range    Ventricular Rate 58 BPM    Atrial Rate 58 BPM    P-R Interval 160 ms    QRS Duration 76 ms    Q-T Interval 456 ms    QTC Calculation (Bezet) 447 ms    Calculated P Axis 69 degrees    Calculated R Axis 58 degrees    Calculated T Axis 34 degrees    Diagnosis       Sinus bradycardia    Confirmed by Thais Tong ([id number]) on 2022 2:26:18 PM         Radiologic Studies  No orders to display     CT Results  (Last 48 hours)    None        CXR Results  (Last 48 hours)    None          Billable Procedures   Procedures    Medical Decision Making     I reviewed the patient's most recent Emergency Dept notes and diagnostic tests in formulating my MDM on today's visit. Provider Notes (Medical Decision Making):   55-year-old female with history of depression, fibromyalgia, complaining of vague lightheadedness and fatigue for the past few days, with nausea, one episode of vomiting, brief syncopal episode, generalized body aches. No other focal pain or respiratory symptoms or fevers. On examination she has normal vital signs. She is in no apparent distress. She has a notably depressed, blunted affect. Normal cardiac and pulmonary and neurologic exam.    EKG is normal.    Her laboratories and urinalysis are all unremarkable. Physical exam unremarkable. Orthostatic vital signs reassuring. No specific etiology is identified for her symptoms. Suspect that this is most likely due to her fibromyalgia and depression. Recommend Toradol and Valium, Compazine, oral rehydration. No clinical concern for ACS, MI, PE, significant arrhythmia, electrolyte derangement, or acute infectious process. Wayne Pascual MD  12:45 PM  2022     Consults:    Social History     Tobacco Use    Smoking status: Former Smoker     Quit date: 2004     Years since quittin.6    Smokeless tobacco: Never Used   Substance Use Topics    Alcohol use:  Yes     Alcohol/week: 0.0 standard drinks     Comment: 1 or 2 glass of wine a month     Drug use: No       Medications Administered during ED course:  Medications   ketorolac (TORADOL) injection 30 mg (30 mg IntraMUSCular Given 22 1301)   diazePAM (VALIUM) tablet 5 mg (5 mg Oral Given 5/4/22 1259)   prochlorperazine (COMPAZINE) tablet 10 mg (10 mg Oral Given 5/4/22 1314)          Prescriptions from today's ED visit:  Discharge Medication List as of 5/4/2022  1:37 PM      START taking these medications    Details   prochlorperazine (Compazine) 10 mg tablet Take 1 Tablet by mouth every six (6) hours as needed for Nausea for up to 7 days. , Normal, Disp-20 Tablet, R-0         CONTINUE these medications which have NOT CHANGED    Details   losartan (COZAAR) 25 mg tablet Take 25 mg by mouth daily. , Historical Med      bupropion HCl (WELLBUTRIN PO) Take  by mouth., Historical Med      busPIRone (BUSPAR) 10 mg tablet Take  by mouth three (3) times daily. , Historical Med      escitalopram oxalate (LEXAPRO) 10 mg tablet Take 10 mg by mouth daily. , Historical Med      amLODIPine (NORVASC) 10 mg tablet Take 1 Tab by mouth daily. , Normal, Disp-30 Tab, R-5      fluocinoNIDE (LIDEX) 0.05 % topical cream Apply  to affected area two (2) times a day., Normal, Disp-30 g, R-1      EPINEPHrine (EPIPEN) 0.3 mg/0.3 mL injection 0.3 mL by IntraMUSCular route once as needed for up to 1 dose., Normal, Disp-1 Syringe, R-1            Diagnosis and Disposition     Disposition:  Discharged    Clinical Impression:   1. Lightheadedness    2. Syncope, vasovagal    3. Fibromyalgia        Attestation:  I personally performed the services described in this documentation on this date 5/4/2022 for patient Amanda Avitia. Eamon Maria MD        I was the first provider for this patient on this visit. To the best of my ability I reviewed relevant prior medical records, electrocardiograms, laboratories, and radiologic studies. The patient's presenting problems were discussed, and the patient was in agreement with the care plan formulated and outlined with them. Eamon Maria MD    Please note that this dictation was completed with Dragon voice recognition software.  Quite often unanticipated grammatical, syntax, homophones, and other interpretive errors are inadvertently transcribed by the computer software. Please disregard these errors and excuse any errors that have escaped final proofreading.

## 2022-06-20 ENCOUNTER — HOSPITAL ENCOUNTER (EMERGENCY)
Age: 57
Discharge: HOME | End: 2022-06-20
Attending: EMERGENCY MEDICINE
Payer: COMMERCIAL

## 2022-06-20 VITALS
BODY MASS INDEX: 35.36 KG/M2 | WEIGHT: 220 LBS | SYSTOLIC BLOOD PRESSURE: 172 MMHG | HEART RATE: 68 BPM | OXYGEN SATURATION: 99 % | HEIGHT: 66 IN | RESPIRATION RATE: 18 BRPM | DIASTOLIC BLOOD PRESSURE: 107 MMHG | TEMPERATURE: 98.7 F

## 2022-06-20 DIAGNOSIS — M25.472 ANKLE EDEMA, BILATERAL: ICD-10-CM

## 2022-06-20 DIAGNOSIS — M25.471 ANKLE EDEMA, BILATERAL: ICD-10-CM

## 2022-06-20 DIAGNOSIS — M79.89 BILATERAL HAND SWELLING: Primary | ICD-10-CM

## 2022-06-20 DIAGNOSIS — B34.9 VIRAL SYNDROME: ICD-10-CM

## 2022-06-20 PROCEDURE — 99283 EMERGENCY DEPT VISIT LOW MDM: CPT

## 2022-06-20 RX ORDER — NALTREXONE HYDROCHLORIDE 50 MG/1
50 TABLET, FILM COATED ORAL DAILY
COMMUNITY
End: 2022-10-06 | Stop reason: CLARIF

## 2022-06-20 RX ORDER — FUROSEMIDE 20 MG/1
20 TABLET ORAL DAILY
Qty: 5 TABLET | Refills: 0 | Status: SHIPPED | OUTPATIENT
Start: 2022-06-20 | End: 2022-06-25

## 2022-06-20 NOTE — ED TRIAGE NOTES
PT. TO ED WITH C/O SWELLING AND PAIN TO HANDS AND FEET FOR A COUPLE OF WEEKS. PT. ALSO C/O SOB ON EXERTION AND LAYING FLAT.

## 2022-06-20 NOTE — DISCHARGE INSTRUCTIONS
You were seen in the ER for your hand and foot swelling. Thankfully, this is unlikely to be a dangerous type of swelling from heart, liver, or kidney problems where your body holds onto fluids. The hand swelling could be from an autoimmune condition like rheumatoid arthritis, so keep an eye on it and follow up with your primary care doctor regarding this problem. Your ankle swelling is likely from being on your feet all the time in conjunction with having a virus giving you an upset stomach. Viruses can often cause swelling of the hands and feet from the increased inflammation. We are giving you a diuretic (\"water pill\") to help pee off some fluids to help you get into the shoes you need for your event this week, but this is not something you should continue long-term if you do not have one of these dangerous liver/kidney/heart issues. Follow up with your primary care doctor to make sure you are getting better and return to the ER for any new or concerning symptoms. Thank you! Thank you for allowing me to care for you in the emergency department. I sincerely hope that you are satisfied with your visit today. It is my goal to provide you with excellent care. Below you will find a list of your labs and imaging from your visit today. Should you have any questions regarding these results please do not hesitate to call the emergency department. Labs -   No results found for this or any previous visit (from the past 12 hour(s)). Radiologic Studies -   No orders to display     CT Results  (Last 48 hours)      None          CXR Results  (Last 48 hours)      None               If you feel that you have not received excellent quality care or timely care, please ask to speak to the nurse manager. Please choose us in the future for your continued health care needs.    ------------------------------------------------------------------------------------------------------------  The exam and treatment you received in the Emergency Department were for an urgent problem and are not intended as complete care. It is important that you follow-up with a doctor, nurse practitioner, or physician assistant to:  (1) confirm your diagnosis,  (2) re-evaluation of changes in your illness and treatment, and  (3) for ongoing care. If your symptoms become worse or you do not improve as expected and you are unable to reach your usual health care provider, you should return to the Emergency Department. We are available 24 hours a day. Please take your discharge instructions with you when you go to your follow-up appointment. If you have any problem arranging a follow-up appointment, contact the Emergency Department immediately. If a prescription has been provided, please have it filled as soon as possible to prevent a delay in treatment. Read the entire medication instruction sheet provided to you by the pharmacy. If you have any questions or reservations about taking the medication due to side effects or interactions with other medications, please call your primary care physician or contact the ER to speak with the charge nurse. Make an appointment with your family doctor or the physician you were referred to for follow-up of this visit as instructed on your discharge paperwork, as this is a mandatory follow-up. Return to the ER if you are unable to be seen or if you are unable to be seen in a timely manner. If you have any problem arranging the follow-up visit, contact the Emergency Department immediately.

## 2022-06-20 NOTE — ED PROVIDER NOTES
EMERGENCY DEPARTMENT HISTORY AND PHYSICAL EXAM      Date: 6/20/2022  Patient Name: Gunnar Fox      History of Presenting Illness     Chief Complaint   Patient presents with    Hand Swelling    Ankle swelling       History Provided By: Patient    HPI: Gunnar Fox, 55 y.o. female with a past medical history significant for Depression, HTN, Fibromyalgia presents to the ED with cc of hand and foot swelling. Pt reports hand swelling x1mo, has improved, might have waxing/waning quality to it. Further endorsing 1wk of worsening ankle and pedal edema, improved today from yesterday. Endorsing nausea w/ 1 episode NBNB emesis daily for past week, as well as episodic crampy abd pain that is relieved by defecation with loose stool diarrhea. Denies watery diarrhea. Denies F/C, current abd pain, cough, rhinorrhea, sick contacts. Denies SOB at rest but states she's felt more winded exerting herself this week. States her family member who is RN heard about her leg swelling and was concerned so told her to come in for evaluation. There are no other complaints, changes, or physical findings at this time. PCP: Ashley Marx MD    Current Outpatient Medications   Medication Sig Dispense Refill    naltrexone (DEPADE) 50 mg tablet Take 50 mg by mouth daily.  furosemide (Lasix) 20 mg tablet Take 1 Tablet by mouth daily for 5 days. 5 Tablet 0    losartan (COZAAR) 25 mg tablet Take 25 mg by mouth daily.  bupropion HCl (WELLBUTRIN PO) Take  by mouth.  busPIRone (BUSPAR) 10 mg tablet Take  by mouth three (3) times daily.  escitalopram oxalate (LEXAPRO) 10 mg tablet Take 10 mg by mouth daily.  amLODIPine (NORVASC) 10 mg tablet Take 1 Tab by mouth daily. 30 Tab 5    fluocinoNIDE (LIDEX) 0.05 % topical cream Apply  to affected area two (2) times a day. 30 g 1    EPINEPHrine (EPIPEN) 0.3 mg/0.3 mL injection 0.3 mL by IntraMUSCular route once as needed for up to 1 dose.  1 Syringe 1       Past History Past Medical History:  Past Medical History:   Diagnosis Date    Abdominal abscess     Contact dermatitis and other eczema, due to unspecified cause     Depression     Dyslipidemia     Headache(784.0)     History of mammography, screening 2013    Normal     Hypertension     Lumbar stenosis 2012    Pleurisy     Psychiatric disorder     DEPRESSION.  Scoliosis        Past Surgical History:  Past Surgical History:   Procedure Laterality Date    ENDOSCOPY, COLON, DIAGNOSTIC      HX COLONOSCOPY      HX ENDOSCOPY      HX GI      COLONOSCOPY X 1    HX GYN       , tubal ligation     HX GYN      hysterectomy secondary to dysmenorrhea    HX ORTHOPAEDIC      ganglian cyst removed from right foot  &     [place] ABDOMEN SURGERY PROC UNLISTED      abdominal abscess drainage       Family History:  Family History   Problem Relation Age of Onset    Diabetes Mother     Thyroid Disease Mother     Thyroid Disease Father     Hypertension Father     Asthma Sister     Mult Sclerosis Sister     Thyroid Disease Sister        Social History:  Social History     Tobacco Use    Smoking status: Former Smoker     Quit date: 2004     Years since quittin.7    Smokeless tobacco: Never Used   Substance Use Topics    Alcohol use: Yes     Alcohol/week: 0.0 standard drinks     Comment: 1 or 2 glass of wine a month     Drug use: No       Allergies:   Allergies   Allergen Reactions    Egg Shortness of Breath and Nausea and Vomiting    Sulfa (Sulfonamide Antibiotics) Anaphylaxis, Shortness of Breath and Swelling    Broccoli Rash and Itching    Chicken Derived Nausea and Vomiting    Dairy Aid [Lactase] Swelling    Doxycycline Unknown (comments)    Gluten Swelling    Pravastatin Myalgia and Other (comments)     Blurry vision    Tetracycline Unknown (comments)    Wheat Nausea and Vomiting    Ace Inhibitors Cough         Review of Systems   Constitutional: Negative except as in HPI.  Eyes: Negative except as in HPI.  ENT: Negative except as in HPI. Cardiovascular: Negative except as in HPI. Respiratory: Negative except as in HPI. Gastrointestinal: Negative except as in HPI. Genitourinary: Negative except as in HPI. Musculoskeletal: Negative except as in HPI. Integumentary: Negative except as in HPI. Neurological: Negative except as in HPI. Psychiatric: Negative except as in HPI. Endocrine: Negative except as in HPI. Hematologic/Lymphatic: Negative except as in HPI. Allergic/Immunologic: Negative except as in HPI. Physical Exam   Constitutional: Awake and alert, interactive, NAD  Eyes: PERRL, no injection or scleral icterus, no discharge  HEENT: NCAT, neck supple, MMM, no oropharyngeal exudates  CV: RRR, no m/r/g  Respiratory: CTAB, no r/r/w  GI: Abd soft, nondistended, nontender  : Deferred  MSK: FROM, no joint effusions, no pitting edema of hands or ankles. Skin: No rashes  Neuro: CN2-12 intact, symmetric facies, fluent speech. Psych: Well-groomed, normal speech, behavior, appropriate mood    Lab and Diagnostic Study Results     Labs -   No results found for this or any previous visit (from the past 12 hour(s)). Radiologic Studies -   [unfilled]  CT Results  (Last 48 hours)    None        CXR Results  (Last 48 hours)    None          Medical Decision Making and ED Course   - I am the first and primary provider for this patient AND AM THE PRIMARY PROVIDER OF RECORD. - I reviewed the vital signs, available nursing notes, past medical history, past surgical history, family history and social history. - Initial assessment performed. The patients presenting problems have been discussed, and the staff are in agreement with the care plan formulated and outlined with them. I have encouraged them to ask questions as they arise throughout their visit. Vital Signs-Reviewed the patient's vital signs.     Patient Vitals for the past 12 hrs:   Temp Pulse Resp BP SpO2 06/20/22 1605 -- 68 18 (!) 172/107 99 %   06/20/22 1603 -- -- -- -- 100 %   06/20/22 1540 98.7 °F (37.1 °C) 72 18 (!) 150/95 99 %       BSUS: No pericardial effusion, no B-lines, good squeeze, no D-sign. Provider Notes (Medical Decision Making):   56F w/intermittent hand and foot swelliing, improving. No e/o CHF on BSUS. Echo 2yrs ago w/normal EF 60%. Could still potentially have HFpEF, however no pitting edema to suggest CHF/ESLD/ESRD. Given underlying N/V/D c/w viral gastroenteritis, suspect viral syndrome causing inflammatory acral edema, potentially 2/2 COVID given not uncommon presentation. Pt has an event in 5d and cannot wear necessary shoes, has not yet tried leg elevation and compression, will give short course of lasix to help hasten reduction in swelling for event. Pt to f/u with PCP in case swelling persists for more thorough rheumatologic as well as heart/liver/kidney workup. Return precautions discussed. ED Course:                Disposition     Disposition: DC- Adult Discharges: All of the diagnostic tests were reviewed and questions answered. Diagnosis, care plan and treatment options were discussed. The patient understands the instructions and will follow up as directed. The patients results have been reviewed with them. They have been counseled regarding their diagnosis. The patient verbally convey understanding and agreement of the signs, symptoms, diagnosis, treatment and prognosis and additionally agrees to follow up as recommended with their PCP in 24 - 48 hours. They also agree with the care-plan and convey that all of their questions have been answered.   I have also put together some discharge instructions for them that include: 1) educational information regarding their diagnosis, 2) how to care for their diagnosis at home, as well a 3) list of reasons why they would want to return to the ED prior to their follow-up appointment, should their condition change. Discharged      Diagnosis     Clinical Impression:   1. Bilateral hand swelling    2. Ankle edema, bilateral    3. Viral syndrome        Attestations:     Barbra Byrne MD

## 2022-07-28 ENCOUNTER — APPOINTMENT (OUTPATIENT)
Dept: CT IMAGING | Age: 57
End: 2022-07-28
Attending: FAMILY MEDICINE
Payer: COMMERCIAL

## 2022-07-28 ENCOUNTER — APPOINTMENT (OUTPATIENT)
Dept: GENERAL RADIOLOGY | Age: 57
End: 2022-07-28
Attending: FAMILY MEDICINE
Payer: COMMERCIAL

## 2022-07-28 ENCOUNTER — HOSPITAL ENCOUNTER (EMERGENCY)
Age: 57
Discharge: HOME | End: 2022-07-28
Attending: FAMILY MEDICINE
Payer: COMMERCIAL

## 2022-07-28 VITALS
HEIGHT: 67 IN | OXYGEN SATURATION: 100 % | DIASTOLIC BLOOD PRESSURE: 95 MMHG | TEMPERATURE: 98.1 F | SYSTOLIC BLOOD PRESSURE: 163 MMHG | RESPIRATION RATE: 18 BRPM | WEIGHT: 220 LBS | HEART RATE: 67 BPM | BODY MASS INDEX: 34.53 KG/M2

## 2022-07-28 DIAGNOSIS — R60.0 LOWER EXTREMITY EDEMA: ICD-10-CM

## 2022-07-28 DIAGNOSIS — R51.9 NONINTRACTABLE EPISODIC HEADACHE, UNSPECIFIED HEADACHE TYPE: Primary | ICD-10-CM

## 2022-07-28 LAB
ALBUMIN SERPL-MCNC: 4.1 G/DL (ref 3.5–5)
ALBUMIN/GLOB SERPL: 1.5 {RATIO} (ref 1.1–2.2)
ALP SERPL-CCNC: 53 U/L (ref 45–117)
ALT SERPL-CCNC: 29 U/L (ref 12–78)
ANION GAP SERPL CALC-SCNC: 9 MMOL/L (ref 5–15)
APPEARANCE UR: CLEAR
AST SERPL W P-5'-P-CCNC: 18 U/L (ref 15–37)
BASOPHILS # BLD: 0 K/UL (ref 0–0.1)
BASOPHILS NFR BLD: 0 % (ref 0–1)
BILIRUB SERPL-MCNC: 0.5 MG/DL (ref 0.2–1)
BILIRUB UR QL: NEGATIVE
BUN SERPL-MCNC: 21 MG/DL (ref 6–20)
BUN/CREAT SERPL: 23 (ref 12–20)
CA-I BLD-MCNC: 9.7 MG/DL (ref 8.5–10.1)
CHLORIDE SERPL-SCNC: 105 MMOL/L (ref 97–108)
CO2 SERPL-SCNC: 25 MMOL/L (ref 21–32)
COLOR UR: YELLOW
CREAT SERPL-MCNC: 0.9 MG/DL (ref 0.55–1.02)
DIFFERENTIAL METHOD BLD: ABNORMAL
EOSINOPHIL # BLD: 0.2 K/UL (ref 0–0.4)
EOSINOPHIL NFR BLD: 3 % (ref 0–7)
ERYTHROCYTE [DISTWIDTH] IN BLOOD BY AUTOMATED COUNT: 15.7 % (ref 11.5–14.5)
GLOBULIN SER CALC-MCNC: 2.7 G/DL (ref 2–4)
GLUCOSE SERPL-MCNC: 103 MG/DL (ref 65–100)
GLUCOSE UR STRIP.AUTO-MCNC: NEGATIVE MG/DL
HCT VFR BLD AUTO: 40.4 % (ref 35–47)
HGB BLD-MCNC: 13.3 G/DL (ref 11.5–16)
HGB UR QL STRIP: NEGATIVE
IMM GRANULOCYTES # BLD AUTO: 0 K/UL (ref 0–0.04)
IMM GRANULOCYTES NFR BLD AUTO: 0 % (ref 0–0.5)
KETONES UR QL STRIP.AUTO: NEGATIVE MG/DL
LEUKOCYTE ESTERASE UR QL STRIP.AUTO: NEGATIVE
LYMPHOCYTES # BLD: 1.4 K/UL (ref 0.8–3.5)
LYMPHOCYTES NFR BLD: 25 % (ref 12–49)
MCH RBC QN AUTO: 30 PG (ref 26–34)
MCHC RBC AUTO-ENTMCNC: 32.9 G/DL (ref 30–36.5)
MCV RBC AUTO: 91.2 FL (ref 80–99)
MONOCYTES # BLD: 0.4 K/UL (ref 0–1)
MONOCYTES NFR BLD: 7 % (ref 5–13)
NEUTS SEG # BLD: 3.6 K/UL (ref 1.8–8)
NEUTS SEG NFR BLD: 65 % (ref 32–75)
NITRITE UR QL STRIP.AUTO: NEGATIVE
PH UR STRIP: 5 [PH] (ref 5–8)
PLATELET # BLD AUTO: 269 K/UL (ref 150–400)
PMV BLD AUTO: 9.3 FL (ref 8.9–12.9)
POTASSIUM SERPL-SCNC: 4.2 MMOL/L (ref 3.5–5.1)
PROT SERPL-MCNC: 6.8 G/DL (ref 6.4–8.2)
PROT UR STRIP-MCNC: NEGATIVE MG/DL
RBC # BLD AUTO: 4.43 M/UL (ref 3.8–5.2)
SODIUM SERPL-SCNC: 139 MMOL/L (ref 136–145)
SP GR UR REFRACTOMETRY: 1.02 (ref 1–1.03)
TROPONIN-HIGH SENSITIVITY: 7 NG/L (ref 0–51)
UROBILINOGEN UR QL STRIP.AUTO: 0.1 EU/DL (ref 0.2–1)
WBC # BLD AUTO: 5.6 K/UL (ref 3.6–11)

## 2022-07-28 PROCEDURE — 80053 COMPREHEN METABOLIC PANEL: CPT

## 2022-07-28 PROCEDURE — 81003 URINALYSIS AUTO W/O SCOPE: CPT

## 2022-07-28 PROCEDURE — 70450 CT HEAD/BRAIN W/O DYE: CPT

## 2022-07-28 PROCEDURE — 84484 ASSAY OF TROPONIN QUANT: CPT

## 2022-07-28 PROCEDURE — 99284 EMERGENCY DEPT VISIT MOD MDM: CPT

## 2022-07-28 PROCEDURE — 85025 COMPLETE CBC W/AUTO DIFF WBC: CPT

## 2022-07-28 PROCEDURE — 36415 COLL VENOUS BLD VENIPUNCTURE: CPT

## 2022-07-28 PROCEDURE — 74011250637 HC RX REV CODE- 250/637: Performed by: FAMILY MEDICINE

## 2022-07-28 PROCEDURE — 71045 X-RAY EXAM CHEST 1 VIEW: CPT

## 2022-07-28 RX ORDER — BUTALBITAL, ACETAMINOPHEN AND CAFFEINE 50; 325; 40 MG/1; MG/1; MG/1
1 TABLET ORAL ONCE
Status: COMPLETED | OUTPATIENT
Start: 2022-07-28 | End: 2022-07-28

## 2022-07-28 RX ORDER — DEXTROMETHORPHAN HYDROBROMIDE, GUAIFENESIN 5; 100 MG/5ML; MG/5ML
650 LIQUID ORAL
Qty: 15 TABLET | Refills: 0 | Status: SHIPPED | OUTPATIENT
Start: 2022-07-28 | End: 2022-08-02

## 2022-07-28 RX ADMIN — BUTALBITAL, ACETAMINOPHEN, AND CAFFEINE 1 TABLET: 50; 325; 40 TABLET ORAL at 11:31

## 2022-07-28 NOTE — ED PROVIDER NOTES
EMERGENCY DEPARTMENT HISTORY AND PHYSICAL EXAM      Date: 7/28/2022  Patient Name: Nestor Ozuna    History of Presenting Illness     Chief Complaint   Patient presents with    Headache       History Provided By:     HPI: Nestor Ozuna, is a very pleasant 55 y.o. female presenting to the ED with a chief complaint of headache, extremity swelling. Patient states she developed gradual onset headache several days ago. Worse over top of head. Occasional blurry vision for several weeks. Currently denies visual disturbances. Also complaining of swelling in her hands and feet. Previously placed on diuretic and is helping. No calf pain or swelling. No chest pain or shortness of breath. The patient denies any other symptoms at this time. PCP: Natasha Santizo MD    No current facility-administered medications on file prior to encounter. Current Outpatient Medications on File Prior to Encounter   Medication Sig Dispense Refill    naltrexone (DEPADE) 50 mg tablet Take 50 mg by mouth daily. losartan (COZAAR) 25 mg tablet Take 25 mg by mouth daily. bupropion HCl (WELLBUTRIN PO) Take  by mouth.      busPIRone (BUSPAR) 10 mg tablet Take  by mouth three (3) times daily. escitalopram oxalate (LEXAPRO) 10 mg tablet Take 10 mg by mouth daily. amLODIPine (NORVASC) 10 mg tablet Take 1 Tab by mouth daily. 30 Tab 5    fluocinoNIDE (LIDEX) 0.05 % topical cream Apply  to affected area two (2) times a day. 30 g 1    EPINEPHrine (EPIPEN) 0.3 mg/0.3 mL injection 0.3 mL by IntraMUSCular route once as needed for up to 1 dose.  1 Syringe 1       Past History     Past Medical History:  Past Medical History:   Diagnosis Date    Abdominal abscess     Contact dermatitis and other eczema, due to unspecified cause     Depression     Dyslipidemia     Headache(784.0)     History of mammography, screening 12/2013    Normal     Hypertension     Lumbar stenosis july 2012    Pleurisy     Psychiatric disorder DEPRESSION. Scoliosis        Past Surgical History:  Past Surgical History:   Procedure Laterality Date    ENDOSCOPY, COLON, DIAGNOSTIC      HX COLONOSCOPY      HX ENDOSCOPY      HX GI      COLONOSCOPY X 1    HX GYN       , tubal ligation     HX GYN      hysterectomy secondary to dysmenorrhea    HX ORTHOPAEDIC      ganglian cyst removed from right foot  &     [place] ABDOMEN SURGERY PROC UNLISTED      abdominal abscess drainage       Family History:  Family History   Problem Relation Age of Onset    Diabetes Mother     Thyroid Disease Mother     Thyroid Disease Father     Hypertension Father     Asthma Sister     Mult Sclerosis Sister     Thyroid Disease Sister        Social History:  Social History     Tobacco Use    Smoking status: Former     Types: Cigarettes     Quit date: 2004     Years since quittin.8    Smokeless tobacco: Never   Substance Use Topics    Alcohol use: Yes     Alcohol/week: 0.0 standard drinks     Comment: 1 or 2 glass of wine a month     Drug use: No       Allergies: Allergies   Allergen Reactions    Egg Shortness of Breath and Nausea and Vomiting    Sulfa (Sulfonamide Antibiotics) Anaphylaxis, Shortness of Breath and Swelling    Broccoli Rash and Itching    Chicken Derived Nausea and Vomiting    Dairy Aid [Lactase] Swelling    Doxycycline Unknown (comments)    Gluten Swelling    Pravastatin Myalgia and Other (comments)     Blurry vision    Tetracycline Unknown (comments)    Wheat Nausea and Vomiting    Ace Inhibitors Cough         Review of Systems     Review of Systems   Constitutional:  Negative for activity change, appetite change, chills, fatigue and fever. HENT:  Negative for congestion and sore throat. Eyes:  Negative for photophobia and visual disturbance. Respiratory:  Negative for cough, shortness of breath and wheezing. Cardiovascular:  Negative for chest pain, palpitations and leg swelling.    Gastrointestinal:  Negative for abdominal pain, diarrhea, nausea and vomiting. Endocrine: Negative for cold intolerance and heat intolerance. Musculoskeletal:  Negative for gait problem and joint swelling. Skin:  Negative for color change and rash. Neurological:  Positive for headaches. Negative for dizziness. Physical Exam     Physical Exam  Constitutional:       Appearance: She is well-developed. HENT:      Head: Normocephalic and atraumatic. Mouth/Throat:      Mouth: Mucous membranes are moist.      Pharynx: Oropharynx is clear. Eyes:      Conjunctiva/sclera: Conjunctivae normal.      Pupils: Pupils are equal, round, and reactive to light. Cardiovascular:      Rate and Rhythm: Normal rate and regular rhythm. Heart sounds: No murmur heard. Pulmonary:      Effort: No respiratory distress. Breath sounds: No stridor. No wheezing, rhonchi or rales. Abdominal:      General: There is no distension. Tenderness: There is no abdominal tenderness. There is no rebound. Musculoskeletal:      Cervical back: Normal range of motion and neck supple. Skin:     General: Skin is warm and dry. Comments: No palpable pitting edema   Neurological:      General: No focal deficit present. Mental Status: She is alert and oriented to person, place, and time. Comments: No focal neurologic deficits, alert and oriented x4, cranial nerves II through XII grossly intact, no sensory deficits, no weakness in extremities, no pronator drift, no abnormal coordination, no abnormal gait, no abnormal deep tendon reflexes. No motor nor sensory deficits. No nystagmus.      Psychiatric:         Mood and Affect: Mood normal.         Behavior: Behavior normal.       Lab and Diagnostic Study Results     Labs -     Recent Results (from the past 12 hour(s))   CBC WITH AUTOMATED DIFF    Collection Time: 07/28/22 10:30 AM   Result Value Ref Range    WBC 5.6 3.6 - 11.0 K/uL    RBC 4.43 3.80 - 5.20 M/uL    HGB 13.3 11.5 - 16.0 g/dL    HCT 40.4 35.0 - 47.0 %    MCV 91.2 80.0 - 99.0 FL    MCH 30.0 26.0 - 34.0 PG    MCHC 32.9 30.0 - 36.5 g/dL    RDW 15.7 (H) 11.5 - 14.5 %    PLATELET 845 321 - 418 K/uL    MPV 9.3 8.9 - 12.9 FL    NEUTROPHILS 65 32 - 75 %    LYMPHOCYTES 25 12 - 49 %    MONOCYTES 7 5 - 13 %    EOSINOPHILS 3 0 - 7 %    BASOPHILS 0 0 - 1 %    IMMATURE GRANULOCYTES 0 0.0 - 0.5 %    ABS. NEUTROPHILS 3.6 1.8 - 8.0 K/UL    ABS. LYMPHOCYTES 1.4 0.8 - 3.5 K/UL    ABS. MONOCYTES 0.4 0.0 - 1.0 K/UL    ABS. EOSINOPHILS 0.2 0.0 - 0.4 K/UL    ABS. BASOPHILS 0.0 0.0 - 0.1 K/UL    ABS. IMM. GRANS. 0.0 0.00 - 0.04 K/UL    DF AUTOMATED     METABOLIC PANEL, COMPREHENSIVE    Collection Time: 07/28/22 10:30 AM   Result Value Ref Range    Sodium 139 136 - 145 mmol/L    Potassium 4.2 3.5 - 5.1 mmol/L    Chloride 105 97 - 108 mmol/L    CO2 25 21 - 32 mmol/L    Anion gap 9 5 - 15 mmol/L    Glucose 103 (H) 65 - 100 mg/dL    BUN 21 (H) 6 - 20 mg/dL    Creatinine 0.90 0.55 - 1.02 mg/dL    BUN/Creatinine ratio 23 (H) 12 - 20      GFR est AA >60 >60 ml/min/1.73m2    GFR est non-AA >60 >60 ml/min/1.73m2    Calcium 9.7 8.5 - 10.1 mg/dL    Bilirubin, total 0.5 0.2 - 1.0 mg/dL    AST (SGOT) 18 15 - 37 U/L    ALT (SGPT) 29 12 - 78 U/L    Alk.  phosphatase 53 45 - 117 U/L    Protein, total 6.8 6.4 - 8.2 g/dL    Albumin 4.1 3.5 - 5.0 g/dL    Globulin 2.7 2.0 - 4.0 g/dL    A-G Ratio 1.5 1.1 - 2.2     TROPONIN-HIGH SENSITIVITY    Collection Time: 07/28/22 10:30 AM   Result Value Ref Range    Troponin-High Sensitivity 7 0 - 51 ng/L   URINALYSIS W/ RFLX MICROSCOPIC    Collection Time: 07/28/22 10:30 AM   Result Value Ref Range    Color Yellow      Appearance Clear Clear      Specific gravity 1.020 1.003 - 1.030      pH (UA) 5.0 5.0 - 8.0      Protein Negative Negative mg/dL    Glucose Negative Negative mg/dL    Ketone Negative Negative mg/dL    Bilirubin Negative Negative      Blood Negative Negative      Urobilinogen 0.1 (L) 0.2 - 1.0 EU/dL    Nitrites Negative Negative      Leukocyte Esterase Negative Negative         Radiologic Studies -   @lastxrresult@  CT Results  (Last 48 hours)                 07/28/22 1101  CT HEAD WO CONT Final result    Impression:  No acute abnormality. Narrative:  EXAM: CT HEAD WO CONT       INDICATION: Headache       COMPARISON: 5/25/2015. CONTRAST: None. TECHNIQUE: Unenhanced CT of the head was performed using 5 mm images. Brain and   bone windows were generated. Coronal and sagittal reformats. CT dose reduction   was achieved through use of a standardized protocol tailored for this   examination and automatic exposure control for dose modulation. FINDINGS:   The ventricles and sulci are normal in size, shape and configuration. . There is   no significant white matter disease. There is no intracranial hemorrhage,   extra-axial collection, or mass effect. The basilar cisterns are open. No CT   evidence of acute infarct. The bone windows demonstrate no abnormalities. The visualized portions of the   paranasal sinuses and mastoid air cells are clear. CXR Results  (Last 48 hours)                 07/28/22 1050  XR CHEST PORT Final result    Impression:  No acute process. Narrative: Indication: Peripheral edema       Comparison: 8/13/2021       Portable exam of the chest obtained at 1050 demonstrates normal heart size. There is no acute process in the lung fields. Dextroconvex scoliosis of the   thoracic spine is noted. Medical Decision Making   - I am the first provider for this patient. - I reviewed the vital signs, available nursing notes, past medical history, past surgical history, family history and social history. - Initial assessment performed. The patients presenting problems have been discussed, and they are in agreement with the care plan formulated and outlined with them. I have encouraged them to ask questions as they arise throughout their visit.     Vital Signs-Reviewed the patient's vital signs. Patient Vitals for the past 12 hrs:   Temp Pulse Resp BP SpO2   07/28/22 1015 98.1 °F (36.7 °C) 67 18 (!) 163/95 100 %         ED Course/ Provider Notes (Medical Decision Making):     Patient presented to the emergency department with the aforementioned chief complaint. On examination the patient is nontoxic. Vitals were reviewed per above. CBC without marked abnormality. CMP without marked abnormality. No renal insufficiency nor transaminitis. High-sensitivity troponin 7. Chest x-ray negative. CT head negative. Will have patient follow-up with cardiology regarding her peripheral edema. Upon reexamining patient she is feeling better. Discharged home in stable amatory condition. Maria Teresa Peoples was given a thorough list of signs and symptoms that would warrant an immediate return to the emergency department. Otherwise Maria Teresa Peoples will follow up with PCP. Procedures   Medical Decision Makingedical Decision Making  Performed by: Romelia Colvin DO  Procedures  None       Disposition   Disposition:     Home     All of the diagnostic tests were reviewed and questions answered. Diagnosis, care plan and treatment options were discussed. The patient understands the instructions and will follow up as directed. The patients results have been reviewed with them. They have been counseled regarding their diagnosis. The patient verbally convey understanding and agreement of the signs, symptoms, diagnosis, treatment and prognosis and additionally agrees to follow up as recommended with their PCP in 24 - 48 hours. They also agree with the care-plan and convey that all of their questions have been answered.   I have also put together some discharge instructions for them that include: 1) educational information regarding their diagnosis, 2) how to care for their diagnosis at home, as well a 3) list of reasons why they would want to return to the ED prior to their follow-up appointment, should their condition change. DISCHARGE PLAN:    1. Cannot display discharge medications since this patient is not currently admitted. 2.   Follow-up Information       Follow up With Specialties Details Why [id number] [facility] 190 Cardiology Call   Steven Tse [address]  [address]    Dr. Pili Rossi MD, [address]  Call  Primary care doctor Please call to make an appointment    [address] [place], [address] [place]              3.  Return to ED if worse       4. Discharge Medication List as of 7/28/2022 11:36 AM        START taking these medications    Details   acetaminophen (Tylenol 8 Hour) 650 mg TbER Take 1 Tablet by mouth every eight (8) hours as needed for Pain for up to 5 days. , Normal, Disp-15 Tablet, R-0           CONTINUE these medications which have NOT CHANGED    Details   naltrexone (DEPADE) 50 mg tablet Take 50 mg by mouth daily. , Historical Med      losartan (COZAAR) 25 mg tablet Take 25 mg by mouth daily. , Historical Med      bupropion HCl (WELLBUTRIN PO) Take  by mouth., Historical Med      busPIRone (BUSPAR) 10 mg tablet Take  by mouth three (3) times daily. , Historical Med      escitalopram oxalate (LEXAPRO) 10 mg tablet Take 10 mg by mouth daily. , Historical Med      amLODIPine (NORVASC) 10 mg tablet Take 1 Tab by mouth daily. , Normal, Disp-30 Tab, R-5      fluocinoNIDE (LIDEX) 0.05 % topical cream Apply  to affected area two (2) times a day., Normal, Disp-30 g, R-1      EPINEPHrine (EPIPEN) 0.3 mg/0.3 mL injection 0.3 mL by IntraMUSCular route once as needed for up to 1 dose., Normal, Disp-1 Syringe, R-1               Diagnosis     Clinical Impression:    1. Nonintractable episodic headache, unspecified headache type    2.  Lower extremity edema        Attestations:    Galilea Smyth, DO    Please note that this dictation was completed with [employer], the [employer] voice recognition software. Quite often unanticipated grammatical, syntax, homophones, and other interpretive errors are inadvertently transcribed by the computer software. Please disregard these errors. Please excuse any errors that have escaped final proofreading. Thank you.

## 2022-07-28 NOTE — ED TRIAGE NOTES
Pt was seen here several weeks ago. Contacted PCP and was told to come to ER. States her sx are worse. C/o constant HA, worsening heartburn, swollen feet/legs. Also c/o bilateral blurry vision for several weeks. Recently had covid.

## 2022-07-28 NOTE — Clinical Note
[address]  [address] [place] [id number]  [phone]    Work/School Note    Date: 7/28/2022    To Whom It May concern:    Eliu Guzman was seen and treated today in the emergency room by the following provider(s):  Attending Provider: Yari Castillo DO. Eliu Guzman is excused from work/school on 07/28/22 and 07/29/22. She is medically clear to return to work/school on 7/30/2022.        Sincerely,          Brenda Verde DO

## 2022-07-28 NOTE — DISCHARGE INSTRUCTIONS
Thank you! Thank you for allowing me to care for you in the emergency department. It is my goal to provide you with excellent care. If you have not received excellent quality care, please ask to speak to the nurse manager. Please fill out the survey that will come to you by mail or email since we listen to your feedback! Below you will find a list of your tests from today's visit. Should you have any questions, please do not hesitate to call the emergency department. Labs  Recent Results (from the past 12 hour(s))   CBC WITH AUTOMATED DIFF    Collection Time: 07/28/22 10:30 AM   Result Value Ref Range    WBC 5.6 3.6 - 11.0 K/uL    RBC 4.43 3.80 - 5.20 M/uL    HGB 13.3 11.5 - 16.0 g/dL    HCT 40.4 35.0 - 47.0 %    MCV 91.2 80.0 - 99.0 FL    MCH 30.0 26.0 - 34.0 PG    MCHC 32.9 30.0 - 36.5 g/dL    RDW 15.7 (H) 11.5 - 14.5 %    PLATELET 330 957 - 717 K/uL    MPV 9.3 8.9 - 12.9 FL    NEUTROPHILS 65 32 - 75 %    LYMPHOCYTES 25 12 - 49 %    MONOCYTES 7 5 - 13 %    EOSINOPHILS 3 0 - 7 %    BASOPHILS 0 0 - 1 %    IMMATURE GRANULOCYTES 0 0.0 - 0.5 %    ABS. NEUTROPHILS 3.6 1.8 - 8.0 K/UL    ABS. LYMPHOCYTES 1.4 0.8 - 3.5 K/UL    ABS. MONOCYTES 0.4 0.0 - 1.0 K/UL    ABS. EOSINOPHILS 0.2 0.0 - 0.4 K/UL    ABS. BASOPHILS 0.0 0.0 - 0.1 K/UL    ABS. IMM. GRANS. 0.0 0.00 - 0.04 K/UL    DF AUTOMATED     METABOLIC PANEL, COMPREHENSIVE    Collection Time: 07/28/22 10:30 AM   Result Value Ref Range    Sodium 139 136 - 145 mmol/L    Potassium 4.2 3.5 - 5.1 mmol/L    Chloride 105 97 - 108 mmol/L    CO2 25 21 - 32 mmol/L    Anion gap 9 5 - 15 mmol/L    Glucose 103 (H) 65 - 100 mg/dL    BUN 21 (H) 6 - 20 mg/dL    Creatinine 0.90 0.55 - 1.02 mg/dL    BUN/Creatinine ratio 23 (H) 12 - 20      GFR est AA >60 >60 ml/min/1.73m2    GFR est non-AA >60 >60 ml/min/1.73m2    Calcium 9.7 8.5 - 10.1 mg/dL    Bilirubin, total 0.5 0.2 - 1.0 mg/dL    AST (SGOT) 18 15 - 37 U/L    ALT (SGPT) 29 12 - 78 U/L    Alk.  phosphatase 53 45 - 117 U/L Protein, total 6.8 6.4 - 8.2 g/dL    Albumin 4.1 3.5 - 5.0 g/dL    Globulin 2.7 2.0 - 4.0 g/dL    A-G Ratio 1.5 1.1 - 2.2     TROPONIN-HIGH SENSITIVITY    Collection Time: 07/28/22 10:30 AM   Result Value Ref Range    Troponin-High Sensitivity 7 0 - 51 ng/L   URINALYSIS W/ RFLX MICROSCOPIC    Collection Time: 07/28/22 10:30 AM   Result Value Ref Range    Color Yellow      Appearance Clear Clear      Specific gravity 1.020 1.003 - 1.030      pH (UA) 5.0 5.0 - 8.0      Protein Negative Negative mg/dL    Glucose Negative Negative mg/dL    Ketone Negative Negative mg/dL    Bilirubin Negative Negative      Blood Negative Negative      Urobilinogen 0.1 (L) 0.2 - 1.0 EU/dL    Nitrites Negative Negative      Leukocyte Esterase Negative Negative         Radiologic Studies  CT HEAD WO CONT   Final Result   No acute abnormality. XR CHEST PORT   Final Result   No acute process. CT Results  (Last 48 hours)                 07/28/22 1101  CT HEAD WO CONT Final result    Impression:  No acute abnormality. Narrative:  EXAM: CT HEAD WO CONT       INDICATION: Headache       COMPARISON: 5/25/2015. CONTRAST: None. TECHNIQUE: Unenhanced CT of the head was performed using 5 mm images. Brain and   bone windows were generated. Coronal and sagittal reformats. CT dose reduction   was achieved through use of a standardized protocol tailored for this   examination and automatic exposure control for dose modulation. FINDINGS:   The ventricles and sulci are normal in size, shape and configuration. . There is   no significant white matter disease. There is no intracranial hemorrhage,   extra-axial collection, or mass effect. The basilar cisterns are open. No CT   evidence of acute infarct. The bone windows demonstrate no abnormalities. The visualized portions of the   paranasal sinuses and mastoid air cells are clear.                  CXR Results  (Last 48 hours)                 07/28/22 1050  XR CHEST PORT Final result    Impression:  No acute process. Narrative: Indication: Peripheral edema       Comparison: 8/13/2021       Portable exam of the chest obtained at 1050 demonstrates normal heart size. There is no acute process in the lung fields. Dextroconvex scoliosis of the   thoracic spine is noted. ------------------------------------------------------------------------------------------------------------  The exam and treatment you received in the Emergency Department were for an urgent problem and are not intended as complete care. It is important that you follow-up with a doctor, nurse practitioner, or physician assistant to:  (1) confirm your diagnosis,  (2) re-evaluation of changes in your illness and treatment, and  (3) for ongoing care. Please take your discharge instructions with you when you go to your follow-up appointment. If you have any problem arranging a follow-up appointment, contact the Emergency Department. If your symptoms become worse or you do not improve as expected and you are unable to reach your health care provider, please return to the Emergency Department. We are available 24 hours a day. If a prescription has been provided, please have it filled as soon as possible to prevent a delay in treatment. If you have any questions or reservations about taking the medication due to side effects or interactions with other medications, please call your primary care provider or contact the ER.

## 2022-10-05 ENCOUNTER — TRANSCRIBE ORDER (OUTPATIENT)
Dept: SCHEDULING | Age: 57
End: 2022-10-05

## 2022-10-05 DIAGNOSIS — Z12.31 VISIT FOR SCREENING MAMMOGRAM: Primary | ICD-10-CM

## 2022-10-05 DIAGNOSIS — Z13.820 ENCOUNTER FOR SCREENING FOR OSTEOPOROSIS: Primary | ICD-10-CM

## 2022-10-06 ENCOUNTER — HOSPITAL ENCOUNTER (OUTPATIENT)
Dept: PREADMISSION TESTING | Age: 57
Discharge: HOME | End: 2022-10-06
Payer: COMMERCIAL

## 2022-10-06 VITALS
TEMPERATURE: 98.8 F | RESPIRATION RATE: 20 BRPM | SYSTOLIC BLOOD PRESSURE: 145 MMHG | WEIGHT: 225 LBS | OXYGEN SATURATION: 100 % | HEART RATE: 79 BPM | BODY MASS INDEX: 35.77 KG/M2 | DIASTOLIC BLOOD PRESSURE: 114 MMHG

## 2022-10-06 LAB
ANION GAP SERPL CALC-SCNC: 6 MMOL/L (ref 5–15)
APTT PPP: 24 SEC (ref 21.2–34.1)
BASOPHILS # BLD: 0 K/UL (ref 0–0.1)
BASOPHILS NFR BLD: 1 % (ref 0–1)
BUN SERPL-MCNC: 24 MG/DL (ref 6–20)
BUN/CREAT SERPL: 32 (ref 12–20)
CA-I BLD-MCNC: 10 MG/DL (ref 8.5–10.1)
CHLORIDE SERPL-SCNC: 105 MMOL/L (ref 97–108)
CO2 SERPL-SCNC: 27 MMOL/L (ref 21–32)
CREAT SERPL-MCNC: 0.74 MG/DL (ref 0.55–1.02)
DIFFERENTIAL METHOD BLD: ABNORMAL
EOSINOPHIL # BLD: 0.1 K/UL (ref 0–0.4)
EOSINOPHIL NFR BLD: 2 % (ref 0–7)
ERYTHROCYTE [DISTWIDTH] IN BLOOD BY AUTOMATED COUNT: 14.7 % (ref 11.5–14.5)
GLUCOSE SERPL-MCNC: 95 MG/DL (ref 65–100)
HCT VFR BLD AUTO: 41.9 % (ref 35–47)
HGB BLD-MCNC: 14 G/DL (ref 11.5–16)
IMM GRANULOCYTES # BLD AUTO: 0 K/UL (ref 0–0.04)
IMM GRANULOCYTES NFR BLD AUTO: 0 % (ref 0–0.5)
INR PPP: 1 (ref 0.9–1.1)
LYMPHOCYTES # BLD: 2 K/UL (ref 0.8–3.5)
LYMPHOCYTES NFR BLD: 36 % (ref 12–49)
MCH RBC QN AUTO: 30 PG (ref 26–34)
MCHC RBC AUTO-ENTMCNC: 33.4 G/DL (ref 30–36.5)
MCV RBC AUTO: 89.7 FL (ref 80–99)
MONOCYTES # BLD: 0.4 K/UL (ref 0–1)
MONOCYTES NFR BLD: 7 % (ref 5–13)
NEUTS SEG # BLD: 3 K/UL (ref 1.8–8)
NEUTS SEG NFR BLD: 54 % (ref 32–75)
NRBC # BLD: 0 K/UL (ref 0–0.01)
NRBC BLD-RTO: 0 PER 100 WBC
PLATELET # BLD AUTO: 305 K/UL (ref 150–400)
PMV BLD AUTO: 8.9 FL (ref 8.9–12.9)
POTASSIUM SERPL-SCNC: 4.5 MMOL/L (ref 3.5–5.1)
PROTHROMBIN TIME: 13.1 SEC (ref 11.9–14.6)
RBC # BLD AUTO: 4.67 M/UL (ref 3.8–5.2)
SODIUM SERPL-SCNC: 138 MMOL/L (ref 136–145)
THERAPEUTIC RANGE,PTTT: NORMAL SEC (ref 82–109)
WBC # BLD AUTO: 5.5 K/UL (ref 3.6–11)

## 2022-10-06 PROCEDURE — 85610 PROTHROMBIN TIME: CPT

## 2022-10-06 PROCEDURE — 36415 COLL VENOUS BLD VENIPUNCTURE: CPT

## 2022-10-06 PROCEDURE — 85730 THROMBOPLASTIN TIME PARTIAL: CPT

## 2022-10-06 PROCEDURE — 80048 BASIC METABOLIC PNL TOTAL CA: CPT

## 2022-10-06 PROCEDURE — 85025 COMPLETE CBC W/AUTO DIFF WBC: CPT

## 2022-10-06 RX ORDER — AZELASTINE HYDROCHLORIDE 0.5 MG/ML
SOLUTION/ DROPS OPHTHALMIC
COMMUNITY
Start: 2022-05-31

## 2022-10-06 RX ORDER — PANTOPRAZOLE SODIUM 40 MG/1
40 TABLET, DELAYED RELEASE ORAL
COMMUNITY
Start: 2022-08-15

## 2022-10-18 ENCOUNTER — ANESTHESIA EVENT (OUTPATIENT)
Dept: SURGERY | Age: 57
End: 2022-10-18
Payer: COMMERCIAL

## 2022-10-18 ENCOUNTER — ANESTHESIA (OUTPATIENT)
Dept: SURGERY | Age: 57
End: 2022-10-18
Payer: COMMERCIAL

## 2022-10-18 ENCOUNTER — HOSPITAL ENCOUNTER (OUTPATIENT)
Age: 57
Discharge: HOME | End: 2022-10-18
Attending: ORTHOPAEDIC SURGERY | Admitting: ORTHOPAEDIC SURGERY
Payer: COMMERCIAL

## 2022-10-18 VITALS
TEMPERATURE: 98.3 F | RESPIRATION RATE: 16 BRPM | OXYGEN SATURATION: 96 % | DIASTOLIC BLOOD PRESSURE: 60 MMHG | HEART RATE: 55 BPM | SYSTOLIC BLOOD PRESSURE: 106 MMHG

## 2022-10-18 DIAGNOSIS — G89.4 CHRONIC PAIN SYNDROME: Primary | ICD-10-CM

## 2022-10-18 LAB
GLUCOSE BLD STRIP.AUTO-MCNC: 102 MG/DL (ref 65–100)
PERFORMED BY, TECHID: ABNORMAL

## 2022-10-18 PROCEDURE — 2709999900 HC NON-CHARGEABLE SUPPLY: Performed by: ORTHOPAEDIC SURGERY

## 2022-10-18 PROCEDURE — 76060000032 HC ANESTHESIA 0.5 TO 1 HR: Performed by: ORTHOPAEDIC SURGERY

## 2022-10-18 PROCEDURE — 77030028271 HC SRGFL HEMSTAT MTRX KT J&J -C: Performed by: ORTHOPAEDIC SURGERY

## 2022-10-18 PROCEDURE — 77030011265 HC ELECTRD BLD HEX COVD -A: Performed by: ORTHOPAEDIC SURGERY

## 2022-10-18 PROCEDURE — C1820 GENERATOR NEURO RECHG BAT SY: HCPCS | Performed by: ORTHOPAEDIC SURGERY

## 2022-10-18 PROCEDURE — 77030008684 HC TU ET CUF COVD -B: Performed by: ANESTHESIOLOGY

## 2022-10-18 PROCEDURE — 77030030612 HC KT CHRG SYS PRECIS BSC -I: Performed by: ORTHOPAEDIC SURGERY

## 2022-10-18 PROCEDURE — 74011000250 HC RX REV CODE- 250

## 2022-10-18 PROCEDURE — 76010000138 HC OR TIME 0.5 TO 1 HR: Performed by: ORTHOPAEDIC SURGERY

## 2022-10-18 PROCEDURE — 76210000063 HC OR PH I REC FIRST 0.5 HR: Performed by: ORTHOPAEDIC SURGERY

## 2022-10-18 PROCEDURE — 76210000026 HC REC RM PH II 1 TO 1.5 HR: Performed by: ORTHOPAEDIC SURGERY

## 2022-10-18 PROCEDURE — 77030037343 HC KT REMOT CTRL FRELNK MRI BSC -G: Performed by: ORTHOPAEDIC SURGERY

## 2022-10-18 PROCEDURE — 77030013079 HC BLNKT BAIR HGGR 3M -A: Performed by: ANESTHESIOLOGY

## 2022-10-18 PROCEDURE — 77030026438 HC STYL ET INTUB CARD -A: Performed by: ANESTHESIOLOGY

## 2022-10-18 PROCEDURE — 77030018673: Performed by: ORTHOPAEDIC SURGERY

## 2022-10-18 PROCEDURE — 77030040361 HC SLV COMPR DVT MDII -B: Performed by: ORTHOPAEDIC SURGERY

## 2022-10-18 PROCEDURE — 77030034479 HC ADH SKN CLSR PRINEO J&J -B: Performed by: ORTHOPAEDIC SURGERY

## 2022-10-18 PROCEDURE — 74011250637 HC RX REV CODE- 250/637: Performed by: ORTHOPAEDIC SURGERY

## 2022-10-18 PROCEDURE — 77030038156 HC CRD BPLR DISP CARF -A: Performed by: ORTHOPAEDIC SURGERY

## 2022-10-18 PROCEDURE — 82962 GLUCOSE BLOOD TEST: CPT

## 2022-10-18 PROCEDURE — 74011250636 HC RX REV CODE- 250/636: Performed by: ORTHOPAEDIC SURGERY

## 2022-10-18 PROCEDURE — 77030019908 HC STETH ESOPH SIMS -A: Performed by: ANESTHESIOLOGY

## 2022-10-18 PROCEDURE — 74011250636 HC RX REV CODE- 250/636

## 2022-10-18 PROCEDURE — 77030031139 HC SUT VCRL2 J&J -A: Performed by: ORTHOPAEDIC SURGERY

## 2022-10-18 DEVICE — STIMULATOR WAVEWRITER ALPHA IMPL PULSE GENRTR KIT: Type: IMPLANTABLE DEVICE | Site: BACK | Status: FUNCTIONAL

## 2022-10-18 RX ORDER — CEFAZOLIN SODIUM 1 G/3ML
INJECTION, POWDER, FOR SOLUTION INTRAMUSCULAR; INTRAVENOUS AS NEEDED
Status: DISCONTINUED | OUTPATIENT
Start: 2022-10-18 | End: 2022-10-18 | Stop reason: HOSPADM

## 2022-10-18 RX ORDER — HYDROCODONE BITARTRATE AND ACETAMINOPHEN 5; 325 MG/1; MG/1
1 TABLET ORAL
Qty: 28 TABLET | Refills: 0 | Status: SHIPPED | OUTPATIENT
Start: 2022-10-18 | End: 2022-10-21

## 2022-10-18 RX ORDER — SODIUM CHLORIDE, SODIUM LACTATE, POTASSIUM CHLORIDE, CALCIUM CHLORIDE 600; 310; 30; 20 MG/100ML; MG/100ML; MG/100ML; MG/100ML
20 INJECTION, SOLUTION INTRAVENOUS CONTINUOUS
Status: DISCONTINUED | OUTPATIENT
Start: 2022-10-18 | End: 2022-10-18 | Stop reason: HOSPADM

## 2022-10-18 RX ORDER — HYDROCODONE BITARTRATE AND ACETAMINOPHEN 5; 325 MG/1; MG/1
1 TABLET ORAL
Status: COMPLETED | OUTPATIENT
Start: 2022-10-18 | End: 2022-10-18

## 2022-10-18 RX ORDER — HYDROMORPHONE HYDROCHLORIDE 1 MG/ML
0.5 INJECTION, SOLUTION INTRAMUSCULAR; INTRAVENOUS; SUBCUTANEOUS
Status: CANCELLED | OUTPATIENT
Start: 2022-10-18

## 2022-10-18 RX ORDER — DEXMEDETOMIDINE HYDROCHLORIDE 100 UG/ML
INJECTION, SOLUTION INTRAVENOUS AS NEEDED
Status: DISCONTINUED | OUTPATIENT
Start: 2022-10-18 | End: 2022-10-18 | Stop reason: HOSPADM

## 2022-10-18 RX ORDER — ROCURONIUM BROMIDE 10 MG/ML
INJECTION, SOLUTION INTRAVENOUS AS NEEDED
Status: DISCONTINUED | OUTPATIENT
Start: 2022-10-18 | End: 2022-10-18 | Stop reason: HOSPADM

## 2022-10-18 RX ORDER — BUPIVACAINE HYDROCHLORIDE AND EPINEPHRINE 2.5; 5 MG/ML; UG/ML
INJECTION, SOLUTION EPIDURAL; INFILTRATION; INTRACAUDAL; PERINEURAL AS NEEDED
Status: DISCONTINUED | OUTPATIENT
Start: 2022-10-18 | End: 2022-10-18 | Stop reason: HOSPADM

## 2022-10-18 RX ORDER — ACETAMINOPHEN 325 MG/1
650 TABLET ORAL ONCE
Status: COMPLETED | OUTPATIENT
Start: 2022-10-18 | End: 2022-10-18

## 2022-10-18 RX ORDER — SODIUM CHLORIDE 0.9 % (FLUSH) 0.9 %
5-40 SYRINGE (ML) INJECTION EVERY 8 HOURS
Status: CANCELLED | OUTPATIENT
Start: 2022-10-18

## 2022-10-18 RX ORDER — SODIUM CHLORIDE 0.9 % (FLUSH) 0.9 %
5-40 SYRINGE (ML) INJECTION AS NEEDED
Status: DISCONTINUED | OUTPATIENT
Start: 2022-10-18 | End: 2022-10-18 | Stop reason: HOSPADM

## 2022-10-18 RX ORDER — FENTANYL CITRATE 50 UG/ML
25 INJECTION, SOLUTION INTRAMUSCULAR; INTRAVENOUS
Status: CANCELLED | OUTPATIENT
Start: 2022-10-18

## 2022-10-18 RX ORDER — FENTANYL CITRATE 50 UG/ML
INJECTION, SOLUTION INTRAMUSCULAR; INTRAVENOUS AS NEEDED
Status: DISCONTINUED | OUTPATIENT
Start: 2022-10-18 | End: 2022-10-18 | Stop reason: HOSPADM

## 2022-10-18 RX ORDER — SODIUM CHLORIDE 0.9 % (FLUSH) 0.9 %
5-40 SYRINGE (ML) INJECTION EVERY 8 HOURS
Status: DISCONTINUED | OUTPATIENT
Start: 2022-10-18 | End: 2022-10-18 | Stop reason: HOSPADM

## 2022-10-18 RX ORDER — MIDAZOLAM HYDROCHLORIDE 1 MG/ML
INJECTION, SOLUTION INTRAMUSCULAR; INTRAVENOUS AS NEEDED
Status: DISCONTINUED | OUTPATIENT
Start: 2022-10-18 | End: 2022-10-18 | Stop reason: HOSPADM

## 2022-10-18 RX ORDER — ONDANSETRON 2 MG/ML
INJECTION INTRAMUSCULAR; INTRAVENOUS AS NEEDED
Status: DISCONTINUED | OUTPATIENT
Start: 2022-10-18 | End: 2022-10-18 | Stop reason: HOSPADM

## 2022-10-18 RX ORDER — PROPOFOL 10 MG/ML
INJECTION, EMULSION INTRAVENOUS AS NEEDED
Status: DISCONTINUED | OUTPATIENT
Start: 2022-10-18 | End: 2022-10-18 | Stop reason: HOSPADM

## 2022-10-18 RX ORDER — ONDANSETRON 2 MG/ML
4 INJECTION INTRAMUSCULAR; INTRAVENOUS AS NEEDED
Status: CANCELLED | OUTPATIENT
Start: 2022-10-18

## 2022-10-18 RX ORDER — SODIUM CHLORIDE 0.9 % (FLUSH) 0.9 %
5-40 SYRINGE (ML) INJECTION AS NEEDED
Status: CANCELLED | OUTPATIENT
Start: 2022-10-18

## 2022-10-18 RX ORDER — DEXAMETHASONE SODIUM PHOSPHATE 4 MG/ML
INJECTION, SOLUTION INTRA-ARTICULAR; INTRALESIONAL; INTRAMUSCULAR; INTRAVENOUS; SOFT TISSUE AS NEEDED
Status: DISCONTINUED | OUTPATIENT
Start: 2022-10-18 | End: 2022-10-18 | Stop reason: HOSPADM

## 2022-10-18 RX ORDER — SODIUM CHLORIDE, SODIUM LACTATE, POTASSIUM CHLORIDE, CALCIUM CHLORIDE 600; 310; 30; 20 MG/100ML; MG/100ML; MG/100ML; MG/100ML
INJECTION, SOLUTION INTRAVENOUS
Status: DISCONTINUED | OUTPATIENT
Start: 2022-10-18 | End: 2022-10-18 | Stop reason: HOSPADM

## 2022-10-18 RX ORDER — LIDOCAINE HYDROCHLORIDE 20 MG/ML
INJECTION, SOLUTION EPIDURAL; INFILTRATION; INTRACAUDAL; PERINEURAL AS NEEDED
Status: DISCONTINUED | OUTPATIENT
Start: 2022-10-18 | End: 2022-10-18 | Stop reason: HOSPADM

## 2022-10-18 RX ADMIN — PROPOFOL 200 MG: 10 INJECTION, EMULSION INTRAVENOUS at 07:33

## 2022-10-18 RX ADMIN — SODIUM CHLORIDE, POTASSIUM CHLORIDE, SODIUM LACTATE AND CALCIUM CHLORIDE: 600; 310; 30; 20 INJECTION, SOLUTION INTRAVENOUS at 07:27

## 2022-10-18 RX ADMIN — DEXAMETHASONE SODIUM PHOSPHATE 4 MG: 4 INJECTION, SOLUTION INTRA-ARTICULAR; INTRALESIONAL; INTRAMUSCULAR; INTRAVENOUS; SOFT TISSUE at 07:37

## 2022-10-18 RX ADMIN — MIDAZOLAM HYDROCHLORIDE 2 MG: 2 INJECTION, SOLUTION INTRAMUSCULAR; INTRAVENOUS at 07:27

## 2022-10-18 RX ADMIN — ONDANSETRON 4 MG: 2 INJECTION INTRAMUSCULAR; INTRAVENOUS at 07:37

## 2022-10-18 RX ADMIN — FENTANYL CITRATE 50 MCG: 50 INJECTION, SOLUTION INTRAMUSCULAR; INTRAVENOUS at 07:41

## 2022-10-18 RX ADMIN — LIDOCAINE HYDROCHLORIDE 100 MG: 20 INJECTION, SOLUTION EPIDURAL; INFILTRATION; INTRACAUDAL; PERINEURAL at 07:33

## 2022-10-18 RX ADMIN — ROCURONIUM BROMIDE 50 MG: 10 INJECTION, SOLUTION INTRAVENOUS at 07:33

## 2022-10-18 RX ADMIN — CEFAZOLIN SODIUM 2 G: 1 INJECTION, POWDER, FOR SOLUTION INTRAMUSCULAR; INTRAVENOUS at 07:37

## 2022-10-18 RX ADMIN — DEXMEDETOMIDINE HYDROCHLORIDE 8 MCG: 100 INJECTION, SOLUTION INTRAVENOUS at 07:49

## 2022-10-18 RX ADMIN — DEXMEDETOMIDINE HYDROCHLORIDE 4 MCG: 100 INJECTION, SOLUTION INTRAVENOUS at 07:55

## 2022-10-18 RX ADMIN — DEXMEDETOMIDINE HYDROCHLORIDE 8 MCG: 100 INJECTION, SOLUTION INTRAVENOUS at 07:41

## 2022-10-18 RX ADMIN — SODIUM CHLORIDE, POTASSIUM CHLORIDE, SODIUM LACTATE AND CALCIUM CHLORIDE 20 ML/HR: 600; 310; 30; 20 INJECTION, SOLUTION INTRAVENOUS at 06:54

## 2022-10-18 RX ADMIN — ACETAMINOPHEN 650 MG: 325 TABLET ORAL at 06:56

## 2022-10-18 RX ADMIN — SUGAMMADEX 200 MG: 100 INJECTION, SOLUTION INTRAVENOUS at 08:08

## 2022-10-18 RX ADMIN — FENTANYL CITRATE 50 MCG: 50 INJECTION, SOLUTION INTRAMUSCULAR; INTRAVENOUS at 07:33

## 2022-10-18 RX ADMIN — HYDROCODONE BITARTRATE AND ACETAMINOPHEN 1 TABLET: 5; 325 TABLET ORAL at 09:01

## 2022-10-18 NOTE — PERIOP NOTES
Family updated at 8:23 AM by Chang Corea RN. Saqib Zhu called and told pt will be  going to [address].

## 2022-10-18 NOTE — ANESTHESIA POSTPROCEDURE EVALUATION
Procedure(s):  SPINAL CORD STIMULATOR PULSE GENERATOR REPLACEMENT. general    Anesthesia Post Evaluation      Multimodal analgesia: multimodal analgesia used between 6 hours prior to anesthesia start to PACU discharge  Patient location during evaluation: PACU  Patient participation: complete - patient participated  Level of consciousness: awake  Pain score: 0  Pain management: adequate  Airway patency: patent  Anesthetic complications: no  Cardiovascular status: acceptable  Respiratory status: acceptable  Hydration status: acceptable  Post anesthesia nausea and vomiting:  controlled  Final Post Anesthesia Temperature Assessment:  Normothermia (36.0-37.5 degrees C)      INITIAL Post-op Vital signs:   Vitals Value Taken Time   /75 10/18/22 0820   Temp 36.1 °C (97 °F) 10/18/22 0819   Pulse 71 10/18/22 0822   Resp 16 10/18/22 0822   SpO2 96 % 10/18/22 0822   Vitals shown include unvalidated device data.

## 2022-10-18 NOTE — PROGRESS NOTES
Dressing to back dry and intact , iv dc'd site intact no swelling noted , discharge  instructions given to pt and pt's  Lida Jones , both verbalized understanding , pain back at level 3 , DR Nichole Valenzuela came to talk with pt

## 2022-10-18 NOTE — OP NOTES
Operative Note    Patient: Bobbi Colon  YOB: 1965  MRN: [id number]    Date of Procedure: 10/18/2022     Pre-Op Diagnosis: Chronic pain syndrome [G89.4]  Lumbar radiculitis [M54.16]    Post-Op Diagnosis: Same as preoperative diagnosis. Procedure(s):  SPINAL CORD STIMULATOR PULSE GENERATOR REPLACEMENT    Surgeon(s):  Dary Oakley MD    Surgical Assistant: None    Anesthesia: General     Estimated Blood Loss (mL):  Minimal    Complications: None    Specimens: * No specimens in log *     Implants:   Implant Name Type Inv. Item Serial No.  Lot No. LRB No. Used Action   STIMULATOR WAVEWRITER ALPHA IMPL PULSE GENRTR KIT - [id number]  [address] [place] [address] KIT [id number] [employer]_WD [id number] N/A 1 Implanted       Drains: * No LDAs found *    Findings: thicker skin flap distally at the pocket was revised. Detailed Description of Procedure:     PROCEDURE PERFORMED  Spinal cord stimulator pulse generator replacement ([id number])    SURGEON  Cas Zimmerman MD    ASSISTANT  Agustin Marcus PA-C    ESTIMATED BLOOD LOSS  10 cc    COMPLICATIONS  None    DISPOSITION  Stable to the PACU    INDICATIONS FOR PROCEDURE  Ms. Michelle Melgar is a 64 yo female who underwent spinal cord stimulator placement several years ago and presents with difficulty charging the battery pack on her stimulator. I recommended replacement of the battery pack. We reviewed the benefits and risks of surgery with the patient and he consented freely to the procedure. The low chance of needing to replace the leads as well was discussed with her. DETAILS OF OPERATIVE INTERVENTION  The patient was identified in the holding area and the operative site was marked. She was taken to the OR and placed in the prone position on the table after intubation. Her back was prepped and draped in the usual sterile fashion.  The patient received Ancef 2 g IV appropriately within one hour before the incision. The appropriate time-out procedure was carried out. The battery pack incision was infiltrated with local anesthetic and incised with a scalpel. The battery pack was expressed out of the wound and the leads were disconnected sequentially. Each lead was placed into the new battery pack and impedances were checked before final tightening the set screws. The pocket was evaluated and the distal portion was noted to be a bit too thick so the pocket was revised with Metzenbaum scissors. The new battery pack was reduced into the revised pocket and impedance check was repeated with good contacts at all the leads. My PA assisted with patient positioning, prepping and draping. He was critical for dissection and retraction of soft tissues during the surgery. He then helped with lead change. He accomplished the skin closure and dressing application. The wound was then irrigated and closed with 2-0 Vicryl. The skin was sealed with Dermabond Prineo. The patient was turned to the supine position and transported to the recovery room in stable condition. She tolerated the procedure well.        Electronically Signed by Dayanna Mei MD on 10/18/2022 at 8:41 AM

## 2022-10-18 NOTE — DISCHARGE INSTRUCTIONS
SHOWER TOMORROW    KEEP DRESSING IN PLACE     FOLLOW UP IN 2 WEEKS , PT STATES SHE HAS FOLLOW UP    HYDROCODONE GIVEN AT 9:01 AM  NEXT DOSE DUE AT 1:01 PM

## 2022-10-18 NOTE — ANESTHESIA PREPROCEDURE EVALUATION
Relevant Problems   RESPIRATORY SYSTEM   (+) Exercise-induced asthma   (+) JENNIE on CPAP      NEUROLOGY   (+) Depression with anxiety      CARDIOVASCULAR   (+) Essential hypertension      ENDOCRINE   (+) Obesity       Anesthetic History   No history of anesthetic complications            Review of Systems / Medical History  Patient summary reviewed and pertinent labs reviewed    Pulmonary        Sleep apnea: CPAP    Asthma   Pertinent negatives: No smoker     Neuro/Psych         Psychiatric history     Cardiovascular    Hypertension                Comments: sinus rhythm   Possible Left atrial enlargement   GI/Hepatic/Renal  Within defined limits              Endo/Other      Hypothyroidism  Obesity     Other Findings            Past Medical History:   Diagnosis Date    Abdominal abscess     Chronic back pain     Chronic bronchitis (HCC)     Chronic shortness of breath     Contact dermatitis and other eczema, due to unspecified cause     COVID-19     Depression     Dyslipidemia     Fibromyalgia     Generalized anxiety disorder     Headache(784.0)     History of mammography, screening 2013    Normal     Hypertension     Lumbar stenosis 2012    Morbid obesity ([address]     Pleurisy     PTSD (post-traumatic stress disorder)     Scoliosis     Sleep apnea     does not use CPAP    Spinal cord stimulator 2018       Past Surgical History:   Procedure Laterality Date    ENDOSCOPY, COLON, DIAGNOSTIC      HX COLONOSCOPY      HX ENDOSCOPY      HX GI      COLONOSCOPY X 1    HX GYN       , tubal ligation     HX GYN      hysterectomy secondary to dysmenorrhea    HX ORTHOPAEDIC      ganglian cyst removed from right foot  &     IR BX THYROID NEEDLE CORE      MT ABDOMEN SURGERY PROC UNLISTED      abdominal abscess drainage       Current Outpatient Medications   Medication Instructions    amLODIPine (NORVASC) 10 mg, Oral, DAILY    azelastine (OPTIVAR) 0.05 % ophthalmic solution INSTILL 1 DROP INTO EACH EYE TWICE DAILY    bupropion HCl (WELLBUTRIN PO) Oral    busPIRone (BUSPAR) 10 mg tablet Oral, 3 TIMES DAILY    EPINEPHrine (EPIPEN) 0.3 mg, IntraMUSCular, ONCE PRN    escitalopram oxalate (LEXAPRO) 10 mg, Oral, DAILY    fluocinoNIDE (LIDEX) 0.05 % topical cream Topical, 2 TIMES DAILY    losartan (COZAAR) 25 mg, Oral, DAILY    pantoprazole (PROTONIX) 40 mg, Oral, DAILY AS NEEDED       Current Facility-Administered Medications   Medication Dose Route Frequency    lactated Ringers infusion  20 mL/hr IntraVENous CONTINUOUS    ceFAZolin (ANCEF) 2 g in sterile water (preservative free) 20 mL IV syringe  2 g IntraVENous ONCE    acetaminophen (TYLENOL) tablet 650 mg  650 mg Oral ONCE       Patient Vitals for the past 24 hrs:   Temp Pulse Resp BP SpO2   10/18/22 0651 36.7 °C (98 °F) 63 16 134/80 99 %       Lab Results   Component Value Date/Time    WBC 5.5 10/06/2022 12:13 PM    WBC 7.6 07/02/2012 05:17 PM    Hemoglobin (POC) 15.0 02/27/2017 06:46 PM    HGB 14.0 10/06/2022 12:13 PM    Hematocrit (POC) 44 02/27/2017 06:46 PM    HCT 41.9 10/06/2022 12:13 PM    PLATELET 266 14/01/1517 12:13 PM    MCV 89.7 10/06/2022 12:13 PM     Lab Results   Component Value Date/Time    Sodium 138 10/06/2022 12:13 PM    Potassium 4.5 10/06/2022 12:13 PM    Chloride 105 10/06/2022 12:13 PM    CO2 27 10/06/2022 12:13 PM    Anion gap 6 10/06/2022 12:13 PM    Glucose 95 10/06/2022 12:13 PM    BUN 24 (H) 10/06/2022 12:13 PM    Creatinine 0.74 10/06/2022 12:13 PM    BUN/Creatinine ratio 32 (H) 10/06/2022 12:13 PM    GFR est AA >60 07/28/2022 10:30 AM    GFR est non-AA >60 07/28/2022 10:30 AM    Calcium 10.0 10/06/2022 12:13 PM     No results found for: APTT, PTP, INR, INREXT  Lab Results   Component Value Date/Time    Glucose 95 10/06/2022 12:13 PM    Glucose (POC) 102 (H) 10/18/2022 06:50 AM              Anesthetic Plan    ASA: 3  Anesthesia type: general    Monitoring Plan: Continuous noninvasive hemodynamic monitoring      Induction: Intravenous  Anesthetic plan and risks discussed with: Patient

## 2022-12-01 ENCOUNTER — HOSPITAL ENCOUNTER (OUTPATIENT)
Dept: MAMMOGRAPHY | Age: 57
End: 2022-12-01
Attending: INTERNAL MEDICINE
Payer: COMMERCIAL

## 2022-12-01 ENCOUNTER — HOSPITAL ENCOUNTER (OUTPATIENT)
Dept: MAMMOGRAPHY | Age: 57
Discharge: HOME | End: 2022-12-01
Attending: INTERNAL MEDICINE
Payer: COMMERCIAL

## 2022-12-01 DIAGNOSIS — Z12.31 VISIT FOR SCREENING MAMMOGRAM: ICD-10-CM

## 2022-12-01 DIAGNOSIS — Z13.820 ENCOUNTER FOR SCREENING FOR OSTEOPOROSIS: ICD-10-CM

## 2022-12-01 PROCEDURE — 77063 BREAST TOMOSYNTHESIS BI: CPT

## 2022-12-01 PROCEDURE — 77080 DXA BONE DENSITY AXIAL: CPT

## 2022-12-21 ENCOUNTER — HOSPITAL ENCOUNTER (EMERGENCY)
Age: 57
Discharge: HOME | End: 2022-12-21
Attending: EMERGENCY MEDICINE
Payer: COMMERCIAL

## 2022-12-21 VITALS
DIASTOLIC BLOOD PRESSURE: 86 MMHG | HEART RATE: 78 BPM | SYSTOLIC BLOOD PRESSURE: 142 MMHG | OXYGEN SATURATION: 97 % | BODY MASS INDEX: 35.68 KG/M2 | HEIGHT: 66 IN | TEMPERATURE: 98.9 F | WEIGHT: 222 LBS | RESPIRATION RATE: 18 BRPM

## 2022-12-21 DIAGNOSIS — R68.89 FLU-LIKE SYMPTOMS: ICD-10-CM

## 2022-12-21 DIAGNOSIS — J06.9 VIRAL UPPER RESPIRATORY TRACT INFECTION: Primary | ICD-10-CM

## 2022-12-21 PROCEDURE — 74011250637 HC RX REV CODE- 250/637: Performed by: EMERGENCY MEDICINE

## 2022-12-21 PROCEDURE — 99283 EMERGENCY DEPT VISIT LOW MDM: CPT

## 2022-12-21 RX ORDER — IBUPROFEN 600 MG/1
600 TABLET ORAL
Qty: 20 TABLET | Refills: 0 | Status: SHIPPED | OUTPATIENT
Start: 2022-12-21

## 2022-12-21 RX ORDER — MULTIVITAMIN
1 CAPSULE ORAL DAILY
Qty: 30 CAPSULE | Refills: 0 | Status: SHIPPED | OUTPATIENT
Start: 2022-12-21

## 2022-12-21 RX ORDER — LORATADINE 10 MG/1
10 TABLET ORAL DAILY
Qty: 10 TABLET | Refills: 0 | Status: SHIPPED | OUTPATIENT
Start: 2022-12-21 | End: 2022-12-31

## 2022-12-21 RX ORDER — ACETAMINOPHEN 325 MG/1
650 TABLET ORAL ONCE
Status: COMPLETED | OUTPATIENT
Start: 2022-12-21 | End: 2022-12-21

## 2022-12-21 RX ORDER — FLUOXETINE 10 MG/1
10 CAPSULE ORAL DAILY
COMMUNITY
Start: 2022-12-06 | End: 2023-03-06

## 2022-12-21 RX ADMIN — ACETAMINOPHEN 650 MG: 325 TABLET, FILM COATED ORAL at 08:25

## 2022-12-21 NOTE — Clinical Note
[address]  [address] [facility] [address]  [phone]    Work/School Note    Date: 12/21/2022    To Whom It May concern:    Hanna Cote was seen and treated today in the emergency room by the following provider(s):  Attending Provider: Juvenal Gomez MD.      Hanna Cote is excused from work/school on 12/21/22 and 12/22/22. She is medically clear to return to work/school on 12/23/2022.        Sincerely,          Alexandro Pastrana MD

## 2022-12-21 NOTE — ED PROVIDER NOTES
EMERGENCY DEPARTMENT HISTORY AND PHYSICAL EXAM      Date: 2022  Patient Name: Dea Preston    History of Presenting Illness     Chief Complaint   Patient presents with    Nasal Congestion    Sore Throat       History Provided By: Patient    HPI: Dea Preston, 65 y.o. female PMHx JENNIE, Chronic pain, CoVID, HTNHLD, Bronchitis presents with cough, nasal congestion, sore throat, chills and body aches. Started 4 days ago. No anosmia or ageusia. She is CoVID vaccinated and boosted. No NVD or abdominal pain. No dysuria, flank pain or hematuria. There are no other complaints, changes, or physical findings at this time.     Past History     Past Medical History:  Past Medical History:   Diagnosis Date    Abdominal abscess     Chronic back pain     Chronic bronchitis (HCC)     Chronic shortness of breath     Contact dermatitis and other eczema, due to unspecified cause     COVID-19     Depression     Dyslipidemia     Fibromyalgia     Generalized anxiety disorder     Headache(784.0)     History of mammography, screening 2013    Normal     Hypertension     Lumbar stenosis 2012    Morbid obesity ([address]     Pleurisy     PTSD (post-traumatic stress disorder)     Scoliosis     Sleep apnea     does not use CPAP    Spinal cord stimulator        Past Surgical History:  Past Surgical History:   Procedure Laterality Date    ENDOSCOPY, COLON, DIAGNOSTIC      HX COLONOSCOPY      HX ENDOSCOPY      HX GI      COLONOSCOPY X 1    HX GYN       , tubal ligation     HX GYN      hysterectomy secondary to dysmenorrhea    HX ORTHOPAEDIC      ganglian cyst removed from right foot  &     IR BX THYROID NEEDLE CORE      [place] ABDOMEN SURGERY PROC UNLISTED      abdominal abscess drainage       Family History:  Family History   Problem Relation Age of Onset    Thyroid Disease Mother     Dementia Mother     Diabetes Sister     Asthma Sister     Mult Sclerosis Sister     Thyroid Disease Sister     Stomach Cancer Paternal Grandmother     Thyroid Disease Father     Hypertension Father     Colon Cancer Maternal Grandfather        Social History:  Social History     Tobacco Use    Smoking status: Former     Types: Cigarettes     Quit date: 2004     Years since quittin.2    Smokeless tobacco: Never   Vaping Use    Vaping Use: Never used   Substance Use Topics    Alcohol use: Yes     Alcohol/week: 2.0 standard drinks     Types: 2 Glasses of wine per week    Drug use: No       Allergies: Allergies   Allergen Reactions    Egg Anaphylaxis, Shortness of Breath and Nausea and Vomiting    Sulfa (Sulfonamide Antibiotics) Anaphylaxis, Shortness of Breath and Swelling    Broccoli Rash and Itching    Chicken Derived Nausea and Vomiting    Dairy Aid [Lactase] Swelling    Doxycycline Unknown (comments)    Gluten Swelling    Pravastatin Myalgia and Other (comments)     Blurry vision    Tetracycline Unknown (comments)    Wheat Nausea and Vomiting    Ace Inhibitors Cough       PCP: Sudha Lu MD    No current facility-administered medications on file prior to encounter. Current Outpatient Medications on File Prior to Encounter   Medication Sig Dispense Refill    FLUoxetine (PROzac) 10 mg capsule Take 10 mg by mouth daily. pantoprazole (PROTONIX) 40 mg tablet Take 40 mg by mouth daily as needed. losartan (COZAAR) 25 mg tablet Take 25 mg by mouth daily. busPIRone (BUSPAR) 10 mg tablet Take  by mouth three (3) times daily. amLODIPine (NORVASC) 10 mg tablet Take 1 Tab by mouth daily. 30 Tab 5    fluocinoNIDE (LIDEX) 0.05 % topical cream Apply  to affected area two (2) times a day. 30 g 1    azelastine (OPTIVAR) 0.05 % ophthalmic solution INSTILL 1 DROP INTO EACH EYE TWICE DAILY (Patient not taking: Reported on 2022)      bupropion HCl (WELLBUTRIN PO) Take  by mouth. (Patient not taking: Reported on 2022)      escitalopram oxalate (LEXAPRO) 10 mg tablet Take 10 mg by mouth daily.  (Patient not taking: Reported on 12/21/2022)      EPINEPHrine (EPIPEN) 0.3 mg/0.3 mL injection 0.3 mL by IntraMUSCular route once as needed for up to 1 dose. 1 Syringe 1       Review of Systems   Review of Systems   Constitutional: Negative. HENT:  Positive for congestion, rhinorrhea and sore throat. Eyes: Negative. Respiratory:  Positive for cough. Cardiovascular: Negative. Gastrointestinal: Negative. Genitourinary: Negative. Neurological: Negative. All other systems reviewed and are negative. Physical Exam   Physical Exam  Vitals and nursing note reviewed. Constitutional:       Appearance: She is well-developed. HENT:      Head: Normocephalic. Nose: Congestion and rhinorrhea present. Mouth/Throat:      Mouth: Mucous membranes are moist.      Pharynx: No pharyngeal swelling, oropharyngeal exudate or posterior oropharyngeal erythema. Tonsils: No tonsillar exudate or tonsillar abscesses. Eyes:      Conjunctiva/sclera: Conjunctivae normal.      Pupils: Pupils are equal, round, and reactive to light. Cardiovascular:      Rate and Rhythm: Normal rate and regular rhythm. Pulmonary:      Effort: Pulmonary effort is normal.      Breath sounds: Normal breath sounds. Musculoskeletal:      Cervical back: Normal range of motion and neck supple. Neurological:      General: No focal deficit present. Mental Status: She is alert and oriented to person, place, and time. Lab and Diagnostic Study Results   Labs -   No results found for this or any previous visit (from the past 12 hour(s)). Radiologic Studies -   @lastxrresult@  CT Results  (Last 48 hours)      None          CXR Results  (Last 48 hours)      None            Medical Decision Making and ED Course   Differential Diagnosis & Medical Decision Making Provider Note:       - I am the first provider for this patient.   I reviewed the vital signs, available nursing notes, past medical history, past surgical history, family history and social history. The patients presenting problems have been discussed, and they are in agreement with the care plan formulated and outlined with them. I have encouraged them to ask questions as they arise throughout their visit. Vital Signs-Reviewed the patient's vital signs. Patient Vitals for the past 12 hrs:   Temp Pulse Resp BP SpO2   12/21/22 0759 -- -- -- (!) 142/86 --   12/21/22 0754 98.9 °F (37.2 °C) 78 18 -- 97 %       ED Course:        HYPERTENSION COUNSELING: Education was provided to the patient today regarding their hypertension. Patient is made aware of their elevated blood pressure and is instructed to follow up this week with their Primary Care for a recheck. Patient is counseled regarding consequences of chronic, uncontrolled hypertension including kidney disease, heart disease, stroke or even death. Patient states their understanding and agrees to follow up this week. Additionally, during their visit, I discussed sodium restriction, maintaining ideal body weight and regular exercise program as physiologic means to achieve blood pressure control. The patient will strive towards this. Procedures   Performed by: Yung Wilder MD  Procedures      Disposition   Disposition: Condition stable and improved  DC- Adult Discharges: All of the diagnostic tests were reviewed and questions answered. Diagnosis, care plan and treatment options were discussed. The patient understands the instructions and will follow up as directed. The patients results have been reviewed with them. They have been counseled regarding their diagnosis. The patient verbally convey understanding and agreement of the signs, symptoms, diagnosis, treatment and prognosis and additionally agrees to follow up as recommended with their PCP in 24 - 48 hours. They also agree with the care-plan and convey that all of their questions have been answered.   I have also put together some discharge instructions for them that include: 1) educational information regarding their diagnosis, 2) how to care for their diagnosis at home, as well a 3) list of reasons why they would want to return to the ED prior to their follow-up appointment, should their condition change. DISCHARGE PLAN:  1. Current Discharge Medication List        CONTINUE these medications which have NOT CHANGED    Details   FLUoxetine (PROzac) 10 mg capsule Take 10 mg by mouth daily. pantoprazole (PROTONIX) 40 mg tablet Take 40 mg by mouth daily as needed. losartan (COZAAR) 25 mg tablet Take 25 mg by mouth daily. busPIRone (BUSPAR) 10 mg tablet Take  by mouth three (3) times daily. amLODIPine (NORVASC) 10 mg tablet Take 1 Tab by mouth daily. Qty: 30 Tab, Refills: 5      fluocinoNIDE (LIDEX) 0.05 % topical cream Apply  to affected area two (2) times a day. Qty: 30 g, Refills: 1    Associated Diagnoses: Eczema, unspecified type      azelastine (OPTIVAR) 0.05 % ophthalmic solution INSTILL 1 DROP INTO EACH EYE TWICE DAILY      bupropion HCl (WELLBUTRIN PO) Take  by mouth.      escitalopram oxalate (LEXAPRO) 10 mg tablet Take 10 mg by mouth daily. EPINEPHrine (EPIPEN) 0.3 mg/0.3 mL injection 0.3 mL by IntraMUSCular route once as needed for up to 1 dose. 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 Follow-up Information       Follow up With Specialties Details Why Kimo Saravia MD Internal Medicine Physician In 1 week  [address] [facility]  [phone]            3. Return to ED if worse   4. Current Discharge Medication List        START taking these medications    Details   multivitamin capsule Take 1 Capsule by mouth daily. Qty: 30 Capsule, Refills: 0  Start date: 12/21/2022      ibuprofen (MOTRIN) 600 mg tablet Take 1 Tablet by mouth every six (6) hours as needed for Pain.   Qty: 20 Tablet, Refills: 0  Start date: 12/21/2022      loratadine (Claritin) 10 mg tablet Take 1 Tablet by mouth daily for 10 days. Qty: 10 Tablet, Refills: 0  Start date: 12/21/2022, End date: 12/31/2022            Remove if admitted/transferred    Diagnosis/Clinical Impression     Clinical Impression:   1. Viral upper respiratory tract infection    2. Flu-like symptoms        Attestations: Nicole Moon MD, am the primary clinician of record. Please note that this dictation was completed with [employer], the [employer] voice recognition software. Quite often unanticipated grammatical, syntax, homophones, and other interpretive errors are inadvertently transcribed by the computer software. Please disregard these errors. Please excuse any errors that have escaped final proofreading. Thank you.

## 2023-03-24 ENCOUNTER — HOSPITAL ENCOUNTER (EMERGENCY)
Age: 58
Discharge: HOME | End: 2023-03-24
Payer: COMMERCIAL

## 2023-03-24 VITALS
DIASTOLIC BLOOD PRESSURE: 88 MMHG | SYSTOLIC BLOOD PRESSURE: 150 MMHG | RESPIRATION RATE: 17 BRPM | BODY MASS INDEX: 35.31 KG/M2 | TEMPERATURE: 98 F | HEIGHT: 67 IN | OXYGEN SATURATION: 99 % | WEIGHT: 225 LBS | HEART RATE: 73 BPM

## 2023-03-24 DIAGNOSIS — K08.89 DENTALGIA: Primary | ICD-10-CM

## 2023-03-24 PROCEDURE — 99283 EMERGENCY DEPT VISIT LOW MDM: CPT

## 2023-03-24 RX ORDER — HYDROCODONE BITARTRATE AND ACETAMINOPHEN 5; 325 MG/1; MG/1
1 TABLET ORAL
Qty: 8 TABLET | Refills: 0 | Status: SHIPPED | OUTPATIENT
Start: 2023-03-24 | End: 2023-03-27

## 2023-03-24 RX ORDER — PENICILLIN V POTASSIUM 500 MG/1
500 TABLET, FILM COATED ORAL 4 TIMES DAILY
Qty: 28 TABLET | Refills: 0 | Status: SHIPPED | OUTPATIENT
Start: 2023-03-24 | End: 2023-03-31

## 2023-03-24 NOTE — ED PROVIDER NOTES
[facility] EMERGENCY DEPT  EMERGENCY DEPARTMENT HISTORY AND PHYSICAL EXAM      Date: 3/24/2023  Patient Name: Alison Landa  MRN: [id number]  [place]: 1965  Date of evaluation: 3/24/2023  Provider: Elen Quick PA-C   Note Started: 8:41 AM 3/24/23    HISTORY OF PRESENT ILLNESS     Chief Complaint   Patient presents with    Dental Pain       History Provided By: Patient    HPI: Alison Landa is a 65 y.o. female with a past medical history significant for HTN, morbid obesity, and depression who presents to this ED with cc of dental pain. Patient reports 3-day history of \"throbbing\" pain to the right lower back side of her mouth. States that she saw a dentist 2 months ago and was advised that she needed root canal the affected tooth. However, patient does not have insurance and has not been able to follow-up to have procedure. She denies any associated fevers, chills, ear pain, headaches, sore throat, facial swelling. States that she has been treating pain with diclofenac with minimal improvement. Patient has no further concerns reports otherwise being well.     PAST MEDICAL HISTORY   Past Medical History:  Past Medical History:   Diagnosis Date    Abdominal abscess     Chronic back pain     Chronic bronchitis (HCC)     Chronic shortness of breath     Contact dermatitis and other eczema, due to unspecified cause     COVID-19     Depression     Dyslipidemia     Fibromyalgia     Generalized anxiety disorder     Headache(784.0)     History of mammography, screening 2013    Normal     Hypertension     Lumbar stenosis 2012    Morbid obesity ([address]     Pleurisy     PTSD (post-traumatic stress disorder)     Scoliosis     Sleep apnea     does not use CPAP    Spinal cord stimulator        Past Surgical History:  Past Surgical History:   Procedure Laterality Date    ENDOSCOPY, COLON, DIAGNOSTIC      HX COLONOSCOPY      HX ENDOSCOPY      HX GI      COLONOSCOPY X 1    HX GYN       , tubal ligation     HX GYN      hysterectomy secondary to dysmenorrhea    HX ORTHOPAEDIC      ganglian cyst removed from right foot  &     IR BX THYROID NEEDLE CORE      [place] ABDOMEN SURGERY PROC UNLISTED      abdominal abscess drainage       Family History:  Family History   Problem Relation Age of Onset    Thyroid Disease Mother     Dementia Mother     Diabetes Sister     Asthma Sister     Mult Sclerosis Sister     Thyroid Disease Sister     Stomach Cancer Paternal Grandmother     Thyroid Disease Father     Hypertension Father     Colon Cancer Maternal Grandfather        Social History:  Social History     Tobacco Use    Smoking status: Former     Types: Cigarettes     Quit date: 2004     Years since quittin.5    Smokeless tobacco: Never   Vaping Use    Vaping Use: Never used   Substance Use Topics    Alcohol use: Yes     Alcohol/week: 2.0 standard drinks     Types: 2 Glasses of wine per week    Drug use: No       Allergies: Allergies   Allergen Reactions    Egg Anaphylaxis, Shortness of Breath and Nausea and Vomiting    Sulfa (Sulfonamide Antibiotics) Anaphylaxis, Shortness of Breath and Swelling    Broccoli Rash and Itching    Chicken Derived Nausea and Vomiting    Dairy Aid [Lactase] Swelling    Doxycycline Unknown (comments)    Gluten Swelling    Pravastatin Myalgia and Other (comments)     Blurry vision    Tetracycline Unknown (comments)    Wheat Nausea and Vomiting    Ace Inhibitors Cough       PCP: Darren Moon MD    Current Meds:   Previous Medications    AMLODIPINE (NORVASC) 10 MG TABLET    Take 1 Tab by mouth daily. AZELASTINE (OPTIVAR) 0.05 % OPHTHALMIC SOLUTION    INSTILL 1 DROP INTO EACH EYE TWICE DAILY    BUPROPION HCL (WELLBUTRIN PO)    Take  by mouth. BUSPIRONE (BUSPAR) 10 MG TABLET    Take  by mouth three (3) times daily. EPINEPHRINE (EPIPEN) 0.3 MG/0.3 ML INJECTION    0.3 mL by IntraMUSCular route once as needed for up to 1 dose.     ESCITALOPRAM OXALATE (LEXAPRO) 10 MG TABLET    Take 10 mg by mouth daily. FLUOCINONIDE (LIDEX) 0.05 % TOPICAL CREAM    Apply  to affected area two (2) times a day. IBUPROFEN (MOTRIN) 600 MG TABLET    Take 1 Tablet by mouth every six (6) hours as needed for Pain. LOSARTAN (COZAAR) 25 MG TABLET    Take 25 mg by mouth daily. MULTIVITAMIN CAPSULE    Take 1 Capsule by mouth daily. PANTOPRAZOLE (PROTONIX) 40 MG TABLET    Take 40 mg by mouth daily as needed. PHYSICAL EXAM     ED Triage Vitals [03/24/23 0818]   ED Encounter Vitals Group      BP (!) 150/88      Pulse (Heart Rate) 73      Resp Rate 17      Temp 98 °F (36.7 °C)      Temp src       O2 Sat (%) 99 %      Weight 225 lb      Height 5' 6.5\"      Physical Exam  Vitals and nursing note reviewed. Constitutional:       General: She is not in acute distress. Appearance: Normal appearance. She is not ill-appearing or toxic-appearing. HENT:      Head: Normocephalic and atraumatic. Mouth/Throat:      Mouth: Mucous membranes are moist. No injury or oral lesions. Dentition: Dental caries present. No dental abscesses. Tongue: No lesions. Palate: No lesions. Pharynx: Oropharynx is clear. Eyes:      Extraocular Movements: Extraocular movements intact. Conjunctiva/sclera: Conjunctivae normal.      Pupils: Pupils are equal, round, and reactive to light. Cardiovascular:      Rate and Rhythm: Normal rate and regular rhythm. Pulses: Normal pulses. Heart sounds: No murmur heard. No friction rub. No gallop. Pulmonary:      Effort: Pulmonary effort is normal.      Breath sounds: Normal breath sounds. No wheezing, rhonchi or rales. Musculoskeletal:      Cervical back: Neck supple. Skin:     General: Skin is warm and dry. Capillary Refill: Capillary refill takes less than 2 seconds. Findings: No rash. Neurological:      General: No focal deficit present. Mental Status: She is alert and oriented to person, place, and time. Psychiatric:         Mood and Affect: Mood normal.         Behavior: Behavior normal.         SCREENINGS              LAB, EKG AND DIAGNOSTIC RESULTS   Labs:  No results found for this or any previous visit (from the past 12 hour(s)). Radiologic Studies:  Non-plain film images such as CT, Ultrasound and MRI are read by the radiologist. Plain radiographic images are visualized and preliminarily interpreted by the ED Physician with the following findings: Not Applicable    Interpretation per the Radiologist below, if available at the time of this note:  No results found. PROCEDURES   Unless otherwise noted below, none. Procedures      CRITICAL CARE TIME   Patient does not meet Critical Care Time    ED COURSE and DIFFERENTIAL DIAGNOSIS/MDM   CC/HPI Summary, DDx, ED Course, and Reassessment:   Patient presents with dental pain. Stable vitals and exam without obvious abscess that needs drainage. Airway stable and patient speaking in full sentences. No red flags that make PTA, RPA, ludwigs angina concerning. Will tx with dental ball, dental block PRN, antibiotics and outpatient analgesics. Pt was given information on dentists and importance of followup and no smoking. Records Reviewed (source and summary of external notes): Prior medical records and Nursing notes    Vitals:    Vitals:    03/24/23 0818   BP: (!) 150/88   Pulse: 73   Resp: 17   Temp: 98 °F (36.7 °C)   SpO2: 99%   Weight: 102.1 kg (225 lb)   Height: 5' 6.5\" (1.689 m)            Patient was given the following medications:  Medications - No data to display    CONSULTS: (Who and What was discussed)  None     Social Determinants affecting Dx or Tx: None    FINAL IMPRESSION     1. [address] [address]          DISPOSITION/PLAN   Discharged    Discharge Note: The patient is stable for discharge home. The signs, symptoms, diagnosis, and discharge instructions have been discussed, understanding conveyed, and agreed upon.  The patient is to follow up as recommended or return to ER should their symptoms worsen. PATIENT REFERRED TO:  Follow-up Information       Follow up With Specialties Details Why [address] [facility] Schedule an appointment as soon as possible for a visit in 1 week  [address] [facility]  [phone]    [facility]   As needed [address]  [address]  [address] [place]    [facility] EMERGENCY DEPT Emergency Medicine  If symptoms worsen [address] [facility] [address] [phone]              DISCHARGE MEDICATIONS:  Current Discharge Medication List        START taking these medications    Details   penicillin v potassium (VEETID) 500 mg tablet Take 1 Tablet by mouth four (4) times daily for 7 days. Qty: 28 Tablet, Refills: 0  Start date: 3/24/2023, End date: 3/31/2023      HYDROcodone-acetaminophen (Norco) 5-325 mg per tablet Take 1 Tablet by mouth every four (4) hours as needed for Pain for up to 3 days. Max Daily Amount: 6 Tablets. Qty: 8 Tablet, Refills: 0  Start date: 3/24/2023, End date: 3/27/2023    Associated Diagnoses: Dentalgia               DISCONTINUED MEDICATIONS:  Current Discharge Medication List          I am the Primary Clinician of Record: Raul Trimble PA-C (electronically signed)    (Please note that parts of this dictation were completed with voice recognition software. Quite often unanticipated grammatical, syntax, homophones, and other interpretive errors are inadvertently transcribed by the computer software. Please disregards these errors.  Please excuse any errors that have escaped final proofreading.)

## 2023-03-24 NOTE — Clinical Note
[address] [facility] EMERGENCY DEPT  [place] [address]  [phone]    Work/School Note    Date: 3/24/2023    To Whom It May concern:    Milton Hoyt was seen and treated today in the emergency room by the following provider(s):  Physician Assistant: Shireen Wilder PA-C. Milton Hoyt is excused from work/school on 03/24/23 and 03/25/23. She is medically clear to return to work/school on 3/26/2023.        Sincerely,          Russ Quick PA-C

## 2023-09-10 ENCOUNTER — HOSPITAL ENCOUNTER (EMERGENCY)
Facility: HOSPITAL | Age: 58
Discharge: HOME | End: 2023-09-10
Payer: COMMERCIAL

## 2023-09-10 VITALS
OXYGEN SATURATION: 97 % | DIASTOLIC BLOOD PRESSURE: 85 MMHG | SYSTOLIC BLOOD PRESSURE: 130 MMHG | HEART RATE: 70 BPM | WEIGHT: 222 LBS | TEMPERATURE: 98.6 F | RESPIRATION RATE: 16 BRPM | BODY MASS INDEX: 35.68 KG/M2 | HEIGHT: 66 IN

## 2023-09-10 DIAGNOSIS — J06.9 VIRAL URI WITH COUGH: Primary | ICD-10-CM

## 2023-09-10 PROCEDURE — 99283 EMERGENCY DEPT VISIT LOW MDM: CPT

## 2023-09-10 RX ORDER — GUAIFENESIN 600 MG/1
600 TABLET, EXTENDED RELEASE ORAL 2 TIMES DAILY
Qty: 10 TABLET | Refills: 0 | Status: SHIPPED | OUTPATIENT
Start: 2023-09-10 | End: 2023-09-15

## 2023-09-10 RX ORDER — DEXTROMETHORPHAN HYDROBROMIDE AND PROMETHAZINE HYDROCHLORIDE 15; 6.25 MG/5ML; MG/5ML
5 SYRUP ORAL 4 TIMES DAILY PRN
Qty: 118 ML | Refills: 0 | Status: SHIPPED | OUTPATIENT
Start: 2023-09-10 | End: 2023-09-17

## 2023-09-10 RX ORDER — PREDNISONE 20 MG/1
60 TABLET ORAL DAILY
Qty: 9 TABLET | Refills: 0 | Status: SHIPPED | OUTPATIENT
Start: 2023-09-10 | End: 2023-09-13

## 2023-09-10 ASSESSMENT — PAIN SCALES - GENERAL: PAINLEVEL_OUTOF10: 5

## 2023-09-10 ASSESSMENT — PAIN DESCRIPTION - FREQUENCY: FREQUENCY: CONTINUOUS

## 2023-09-10 ASSESSMENT — PAIN DESCRIPTION - PAIN TYPE: TYPE: ACUTE PAIN

## 2023-09-10 ASSESSMENT — PAIN DESCRIPTION - LOCATION: LOCATION: GENERALIZED

## 2023-09-10 ASSESSMENT — LIFESTYLE VARIABLES
HOW OFTEN DO YOU HAVE A DRINK CONTAINING ALCOHOL: MONTHLY OR LESS
HOW MANY STANDARD DRINKS CONTAINING ALCOHOL DO YOU HAVE ON A TYPICAL DAY: 1 OR 2

## 2023-09-10 ASSESSMENT — PAIN - FUNCTIONAL ASSESSMENT
PAIN_FUNCTIONAL_ASSESSMENT: 0-10
PAIN_FUNCTIONAL_ASSESSMENT: ACTIVITIES ARE NOT PREVENTED

## 2023-09-10 ASSESSMENT — PAIN DESCRIPTION - ONSET: ONSET: GRADUAL

## 2023-09-10 ASSESSMENT — PAIN DESCRIPTION - DESCRIPTORS: DESCRIPTORS: ACHING

## 2023-09-10 NOTE — ED TRIAGE NOTES
Cough x 4 days. Wants to make sure \"I am not contagious because I have to stay at the hospital with my mother\". Pt aware we do not offer rapid covid swabs. +body aches.

## 2023-09-10 NOTE — ED PROVIDER NOTES
Bowel sounds are normal. There is no distension. Palpations: Abdomen is soft. There is no mass. Tenderness: There is no abdominal tenderness. There is no right CVA tenderness, left CVA tenderness, guarding or rebound. Hernia: No hernia is present. Musculoskeletal:      Cervical back: Normal range of motion and neck supple. No rigidity. Right lower leg: No edema. Left lower leg: No edema. Lymphadenopathy:      Cervical: No cervical adenopathy. Skin:     General: Skin is warm and dry. Coloration: Skin is not jaundiced or pale. Findings: No bruising, erythema, lesion or rash. Neurological:      Mental Status: She is alert and oriented to person, place, and time. Psychiatric:         Mood and Affect: Mood normal.         Behavior: Behavior normal.         Thought Content: Thought content normal.         Judgment: Judgment normal.         SCREENINGS              LAB, EKG AND DIAGNOSTIC RESULTS   Labs:  No results found for this or any previous visit (from the past 12 hour(s)). EKG: Not Applicable    Radiologic Studies:  Non-plain film images such as CT, Ultrasound and MRI are read by the radiologist. Plain radiographic images are visualized and preliminarily interpreted by the ED Physician with the following findings: Not Applicable. Interpretation per the Radiologist below, if available at the time of this note:  No orders to display        ED COURSE and DIFFERENTIAL DIAGNOSIS/MDM   CC/HPI Summary, DDx, ED Course, and Reassessment. Disposition Considerations (Tests not done, Shared Decision Making, Pt Expectation of Test or Treatment.):     Patient presents with  with complaints of cough, body aches, sore throat, left ear pain, nausea for 4 days. DDx: Influenza, covid, acute bronchitis, URI, PNA, sinusitis. Symptomatic therapy suggested: increase fluids, use vaporizer, stay in steamy bathroom tid 15 min prn severe cough, rest, avoid smoky areas.  Lack of antibiotic

## 2024-06-11 NOTE — ED NOTES
Patient ambulated down danielle, no SOB
The physician has reviewed discharge instructions with the patient. The patient verbalized understanding. Patient ambulated out of the Emergency Department.
Attending Attestation (For Attendings USE Only)...

## (undated) DEVICE — GARMENT,MEDLINE,DVT,INT,CALF,MED, GEN2: Brand: MEDLINE

## (undated) DEVICE — REM POLYHESIVE ADULT PATIENT RETURN ELECTRODE: Brand: VALLEYLAB

## (undated) DEVICE — SPONGE GZ W4XL4IN COT 12 PLY TYP VII WVN C FLD DSGN

## (undated) DEVICE — SEALANT HEMOSTAT W/THROM 8ML -- SURGIFLO MATRIX

## (undated) DEVICE — TIP SUCTION FLANGE YANKAUER FLX NO VENT FN CAP STRL

## (undated) DEVICE — GLOVE ORANGE PI 7 1/2   MSG9075

## (undated) DEVICE — INTENDED FOR TISSUE SEPARATION, AND OTHER PROCEDURES THAT REQUIRE A SHARP SURGICAL BLADE TO PUNCTURE OR CUT.: Brand: BARD-PARKER SAFETY BLADES SIZE 10, STERILE

## (undated) DEVICE — Device

## (undated) DEVICE — SOLUTION IRRIG 1000ML 0.9% SOD CHL USP POUR PLAS BTL

## (undated) DEVICE — SOUTHSIDE TURNOVER: Brand: MEDLINE INDUSTRIES, INC.

## (undated) DEVICE — CHS NEURO PLUS 1: Brand: MEDLINE INDUSTRIES, INC.

## (undated) DEVICE — PREP SKN CHLRAPRP APL 26ML STR --

## (undated) DEVICE — HEX-LOCKING BLADE ELECTRODE: Brand: EDGE

## (undated) DEVICE — DRAPE EQUIP C ARM 74X42 IN MOB XR W/ TIE RUBBER BND LF

## (undated) DEVICE — INTENDED FOR TISSUE SEPARATION, AND OTHER PROCEDURES THAT REQUIRE A SHARP SURGICAL BLADE TO PUNCTURE OR CUT.: Brand: BARD-PARKER SAFETY BLADES SIZE 15, STERILE

## (undated) DEVICE — SYSTEM SKIN CLSR 22CM DERMBND PRINEO

## (undated) DEVICE — 3M™ IOBAN™ 2 ANTIMICROBIAL INCISE DRAPE 6650EZ: Brand: IOBAN™ 2

## (undated) DEVICE — FORCEPS BIPOLAR 8.25IN .75MM STRAIGHT BAYONET

## (undated) DEVICE — GLOVE SURG SZ 75 L12IN FNGR THK79MIL GRN LTX FREE

## (undated) DEVICE — TOWEL SURG W17XL27IN STD BLU COT NONFENESTRATED PREWASHED

## (undated) DEVICE — TIBURON UNIVERSAL SPINE DRAPE: Brand: CONVERTORS

## (undated) DEVICE — HEAD FRAME WITH SOFT LAYER FOAM POSITIONER: Brand: CARDINAL HEALTH

## (undated) DEVICE — GLOVE SURG SZ 8 L12IN FNGR THK79MIL GRN LTX FREE

## (undated) DEVICE — PROGRAMMER CHRGR PRECISION 3 -- SC-6412-3

## (undated) DEVICE — CORD BPLR 12FT SGL USE CLR

## (undated) DEVICE — DRAPE,REIN 53X77,STERILE: Brand: MEDLINE

## (undated) DEVICE — GOWN,PREVENTION PLUS,XLN/XL,ST,24/CS: Brand: MEDLINE

## (undated) DEVICE — SUT VCRL + 1 27IN OS6 VIO --

## (undated) DEVICE — 3M™ STERI-STRIP™ REINFORCED ADHESIVE SKIN CLOSURES, R1542, 1/4 IN X 1-1/2 IN (6 MM X 38 MM), 6 STRIPS/ENVELOPE: Brand: 3M™ STERI-STRIP™

## (undated) DEVICE — DRAPE SURG TOWEL SM 18X12 IN INVISISHIELD MP W/ ADH PLAS NS

## (undated) DEVICE — BASIC SINGLE BASIN-LF: Brand: MEDLINE INDUSTRIES, INC.

## (undated) DEVICE — SPONGE LAP 18X18IN STRL -- 5/PK

## (undated) DEVICE — LAMINECTOMY ARM CRADLE FOAM POSITIONER: Brand: CARDINAL HEALTH

## (undated) DEVICE — SUTURE VCRL + SZ 2-0 L27IN ABSRB UD CP-1 1/2 CIR REV CUT VCP266H